# Patient Record
Sex: FEMALE | Race: WHITE | NOT HISPANIC OR LATINO | URBAN - METROPOLITAN AREA
[De-identification: names, ages, dates, MRNs, and addresses within clinical notes are randomized per-mention and may not be internally consistent; named-entity substitution may affect disease eponyms.]

---

## 2017-11-13 ENCOUNTER — RECORDS - HEALTHEAST (OUTPATIENT)
Dept: LAB | Facility: CLINIC | Age: 62
End: 2017-11-13

## 2017-11-14 LAB
CHOLEST SERPL-MCNC: 140 MG/DL
FASTING STATUS PATIENT QL REPORTED: ABNORMAL
HDLC SERPL-MCNC: 34 MG/DL
LDLC SERPL CALC-MCNC: 63 MG/DL
TRIGL SERPL-MCNC: 216 MG/DL

## 2017-11-15 LAB — HCV AB SERPL QL IA: NEGATIVE

## 2018-06-04 ENCOUNTER — RECORDS - HEALTHEAST (OUTPATIENT)
Dept: LAB | Facility: CLINIC | Age: 63
End: 2018-06-04

## 2018-06-05 LAB
ALBUMIN SERPL-MCNC: 4.2 G/DL (ref 3.5–5)
ALP SERPL-CCNC: 81 U/L (ref 45–120)
ALT SERPL W P-5'-P-CCNC: 22 U/L (ref 0–45)
ANION GAP SERPL CALCULATED.3IONS-SCNC: 13 MMOL/L (ref 5–18)
AST SERPL W P-5'-P-CCNC: 21 U/L (ref 0–40)
BILIRUB SERPL-MCNC: 0.5 MG/DL (ref 0–1)
BUN SERPL-MCNC: 15 MG/DL (ref 8–22)
CALCIUM SERPL-MCNC: 10 MG/DL (ref 8.5–10.5)
CHLORIDE BLD-SCNC: 104 MMOL/L (ref 98–107)
CO2 SERPL-SCNC: 26 MMOL/L (ref 22–31)
CREAT SERPL-MCNC: 0.7 MG/DL (ref 0.6–1.1)
GFR SERPL CREATININE-BSD FRML MDRD: >60 ML/MIN/1.73M2
GLUCOSE BLD-MCNC: 111 MG/DL (ref 70–125)
POTASSIUM BLD-SCNC: 4.2 MMOL/L (ref 3.5–5)
PROT SERPL-MCNC: 6.8 G/DL (ref 6–8)
SODIUM SERPL-SCNC: 143 MMOL/L (ref 136–145)

## 2018-11-05 ENCOUNTER — RECORDS - HEALTHEAST (OUTPATIENT)
Dept: LAB | Facility: CLINIC | Age: 63
End: 2018-11-05

## 2018-11-06 ENCOUNTER — RECORDS - HEALTHEAST (OUTPATIENT)
Dept: LAB | Facility: CLINIC | Age: 63
End: 2018-11-06

## 2018-11-06 LAB
ALBUMIN SERPL-MCNC: 3.7 G/DL (ref 3.5–5)
ALP SERPL-CCNC: 89 U/L (ref 45–120)
ALT SERPL W P-5'-P-CCNC: 14 U/L (ref 0–45)
ANION GAP SERPL CALCULATED.3IONS-SCNC: 10 MMOL/L (ref 5–18)
AST SERPL W P-5'-P-CCNC: 15 U/L (ref 0–40)
BILIRUB SERPL-MCNC: 0.3 MG/DL (ref 0–1)
BUN SERPL-MCNC: 14 MG/DL (ref 8–22)
CALCIUM SERPL-MCNC: 9.7 MG/DL (ref 8.5–10.5)
CHLORIDE BLD-SCNC: 104 MMOL/L (ref 98–107)
CHOLEST SERPL-MCNC: 141 MG/DL
CO2 SERPL-SCNC: 27 MMOL/L (ref 22–31)
CREAT SERPL-MCNC: 0.66 MG/DL (ref 0.6–1.1)
CREAT UR-MCNC: 184.4 MG/DL
FASTING STATUS PATIENT QL REPORTED: ABNORMAL
GFR SERPL CREATININE-BSD FRML MDRD: >60 ML/MIN/1.73M2
GLUCOSE BLD-MCNC: 125 MG/DL (ref 70–125)
HDLC SERPL-MCNC: 38 MG/DL
LDLC SERPL CALC-MCNC: 63 MG/DL
MICROALBUMIN UR-MCNC: 4.03 MG/DL (ref 0–1.99)
MICROALBUMIN/CREAT UR: 21.9 MG/G
POTASSIUM BLD-SCNC: 4.2 MMOL/L (ref 3.5–5)
PROT SERPL-MCNC: 6.5 G/DL (ref 6–8)
SODIUM SERPL-SCNC: 141 MMOL/L (ref 136–145)
TRIGL SERPL-MCNC: 201 MG/DL

## 2019-05-02 ENCOUNTER — RECORDS - HEALTHEAST (OUTPATIENT)
Dept: LAB | Facility: CLINIC | Age: 64
End: 2019-05-02

## 2019-05-02 ENCOUNTER — RECORDS - HEALTHEAST (OUTPATIENT)
Dept: ADMINISTRATIVE | Facility: OTHER | Age: 64
End: 2019-05-02

## 2019-05-02 LAB
ALBUMIN SERPL-MCNC: 3.8 G/DL (ref 3.5–5)
ANION GAP SERPL CALCULATED.3IONS-SCNC: 14 MMOL/L (ref 5–18)
BUN SERPL-MCNC: 17 MG/DL (ref 8–22)
CALCIUM SERPL-MCNC: 9.6 MG/DL (ref 8.5–10.5)
CHLORIDE BLD-SCNC: 107 MMOL/L (ref 98–107)
CO2 SERPL-SCNC: 21 MMOL/L (ref 22–31)
CREAT SERPL-MCNC: 0.68 MG/DL (ref 0.6–1.1)
GFR SERPL CREATININE-BSD FRML MDRD: >60 ML/MIN/1.73M2
GLUCOSE BLD-MCNC: 97 MG/DL (ref 70–125)
PHOSPHATE SERPL-MCNC: 4 MG/DL (ref 2.5–4.5)
POTASSIUM BLD-SCNC: 4.4 MMOL/L (ref 3.5–5)
SODIUM SERPL-SCNC: 142 MMOL/L (ref 136–145)

## 2019-07-16 ENCOUNTER — RECORDS - HEALTHEAST (OUTPATIENT)
Dept: ADMINISTRATIVE | Facility: OTHER | Age: 64
End: 2019-07-16

## 2019-07-25 ENCOUNTER — RECORDS - HEALTHEAST (OUTPATIENT)
Dept: ADMINISTRATIVE | Facility: OTHER | Age: 64
End: 2019-07-25

## 2019-07-29 ENCOUNTER — AMBULATORY - HEALTHEAST (OUTPATIENT)
Dept: SURGERY | Facility: CLINIC | Age: 64
End: 2019-07-29

## 2019-07-29 DIAGNOSIS — C80.1 DUCTAL CARCINOMA (H): ICD-10-CM

## 2019-07-30 ENCOUNTER — RECORDS - HEALTHEAST (OUTPATIENT)
Dept: RADIOLOGY | Facility: CLINIC | Age: 64
End: 2019-07-30

## 2019-07-30 ENCOUNTER — RECORDS - HEALTHEAST (OUTPATIENT)
Dept: ADMINISTRATIVE | Facility: OTHER | Age: 64
End: 2019-07-30

## 2019-08-08 ENCOUNTER — COMMUNICATION - HEALTHEAST (OUTPATIENT)
Dept: TELEHEALTH | Facility: CLINIC | Age: 64
End: 2019-08-08

## 2019-08-08 ENCOUNTER — HOSPITAL ENCOUNTER (OUTPATIENT)
Dept: SURGERY | Facility: CLINIC | Age: 64
Discharge: HOME | End: 2019-08-08
Attending: FAMILY MEDICINE

## 2019-08-08 DIAGNOSIS — C50.411 MALIGNANT NEOPLASM OF UPPER-OUTER QUADRANT OF RIGHT BREAST IN FEMALE, ESTROGEN RECEPTOR POSITIVE (H): ICD-10-CM

## 2019-08-08 DIAGNOSIS — Z17.0 MALIGNANT NEOPLASM OF UPPER-OUTER QUADRANT OF RIGHT BREAST IN FEMALE, ESTROGEN RECEPTOR POSITIVE (H): ICD-10-CM

## 2019-08-08 ASSESSMENT — MIFFLIN-ST. JEOR: SCORE: 1521.99

## 2019-08-12 ENCOUNTER — AMBULATORY - HEALTHEAST (OUTPATIENT)
Dept: SURGERY | Facility: CLINIC | Age: 64
End: 2019-08-12

## 2019-08-12 DIAGNOSIS — Z17.0 MALIGNANT NEOPLASM OF UPPER-OUTER QUADRANT OF RIGHT BREAST IN FEMALE, ESTROGEN RECEPTOR POSITIVE (H): ICD-10-CM

## 2019-08-12 DIAGNOSIS — C50.411 MALIGNANT NEOPLASM OF UPPER-OUTER QUADRANT OF RIGHT BREAST IN FEMALE, ESTROGEN RECEPTOR POSITIVE (H): ICD-10-CM

## 2019-08-13 ENCOUNTER — COMMUNICATION - HEALTHEAST (OUTPATIENT)
Dept: SURGERY | Facility: CLINIC | Age: 64
End: 2019-08-13

## 2019-08-21 ENCOUNTER — COMMUNICATION - HEALTHEAST (OUTPATIENT)
Dept: SURGERY | Facility: CLINIC | Age: 64
End: 2019-08-21

## 2019-09-03 ENCOUNTER — COMMUNICATION - HEALTHEAST (OUTPATIENT)
Dept: SURGERY | Facility: CLINIC | Age: 64
End: 2019-09-03

## 2019-09-03 DIAGNOSIS — C50.411 MALIGNANT NEOPLASM OF UPPER-OUTER QUADRANT OF RIGHT BREAST IN FEMALE, ESTROGEN RECEPTOR POSITIVE (H): ICD-10-CM

## 2019-09-03 DIAGNOSIS — Z17.0 MALIGNANT NEOPLASM OF UPPER-OUTER QUADRANT OF RIGHT BREAST IN FEMALE, ESTROGEN RECEPTOR POSITIVE (H): ICD-10-CM

## 2019-09-04 ENCOUNTER — RECORDS - HEALTHEAST (OUTPATIENT)
Dept: LAB | Facility: CLINIC | Age: 64
End: 2019-09-04

## 2019-09-04 ENCOUNTER — RECORDS - HEALTHEAST (OUTPATIENT)
Dept: ADMINISTRATIVE | Facility: OTHER | Age: 64
End: 2019-09-04

## 2019-09-04 LAB
ALBUMIN SERPL-MCNC: 4.2 G/DL (ref 3.5–5)
ANION GAP SERPL CALCULATED.3IONS-SCNC: 9 MMOL/L (ref 5–18)
BUN SERPL-MCNC: 17 MG/DL (ref 8–22)
CALCIUM SERPL-MCNC: 10.1 MG/DL (ref 8.5–10.5)
CHLORIDE BLD-SCNC: 105 MMOL/L (ref 98–107)
CO2 SERPL-SCNC: 29 MMOL/L (ref 22–31)
CREAT SERPL-MCNC: 0.79 MG/DL (ref 0.6–1.1)
GFR SERPL CREATININE-BSD FRML MDRD: >60 ML/MIN/1.73M2
GLUCOSE BLD-MCNC: 196 MG/DL (ref 70–125)
PHOSPHATE SERPL-MCNC: 3.1 MG/DL (ref 2.5–4.5)
POTASSIUM BLD-SCNC: 4.8 MMOL/L (ref 3.5–5)
SODIUM SERPL-SCNC: 143 MMOL/L (ref 136–145)

## 2019-09-04 ASSESSMENT — MIFFLIN-ST. JEOR: SCORE: 1521.99

## 2019-09-05 ENCOUNTER — ANESTHESIA - HEALTHEAST (OUTPATIENT)
Dept: SURGERY | Facility: AMBULATORY SURGERY CENTER | Age: 64
End: 2019-09-05

## 2019-09-06 ENCOUNTER — HOSPITAL ENCOUNTER (OUTPATIENT)
Dept: MAMMOGRAPHY | Facility: CLINIC | Age: 64
Discharge: HOME | End: 2019-09-06
Attending: SPECIALIST

## 2019-09-06 ENCOUNTER — HOSPITAL ENCOUNTER (OUTPATIENT)
Dept: NUCLEAR MEDICINE | Facility: HOSPITAL | Age: 64
Discharge: HOME | End: 2019-09-06
Attending: SPECIALIST

## 2019-09-06 ENCOUNTER — SURGERY - HEALTHEAST (OUTPATIENT)
Dept: SURGERY | Facility: AMBULATORY SURGERY CENTER | Age: 64
End: 2019-09-06

## 2019-09-06 ENCOUNTER — HOSPITAL ENCOUNTER (OUTPATIENT)
Dept: MAMMOGRAPHY | Facility: CLINIC | Age: 64
Discharge: HOME | End: 2019-09-06
Attending: SPECIALIST | Admitting: RADIOLOGY

## 2019-09-06 DIAGNOSIS — Z17.0 MALIGNANT NEOPLASM OF UPPER-OUTER QUADRANT OF RIGHT BREAST IN FEMALE, ESTROGEN RECEPTOR POSITIVE (H): ICD-10-CM

## 2019-09-06 DIAGNOSIS — C50.411 MALIGNANT NEOPLASM OF UPPER-OUTER QUADRANT OF RIGHT BREAST IN FEMALE, ESTROGEN RECEPTOR POSITIVE (H): ICD-10-CM

## 2019-09-06 RX ORDER — ASCORBIC ACID 500 MG
500 TABLET ORAL DAILY
Status: SHIPPED | COMMUNITY
Start: 2019-09-06

## 2019-09-06 ASSESSMENT — MIFFLIN-ST. JEOR: SCORE: 1521.99

## 2019-09-09 ENCOUNTER — RECORDS - HEALTHEAST (OUTPATIENT)
Dept: LAB | Facility: CLINIC | Age: 64
End: 2019-09-09

## 2019-09-09 LAB
ALBUMIN SERPL-MCNC: 3.9 G/DL (ref 3.5–5)
ANION GAP SERPL CALCULATED.3IONS-SCNC: 9 MMOL/L (ref 5–18)
BUN SERPL-MCNC: 14 MG/DL (ref 8–22)
CALCIUM SERPL-MCNC: 9.3 MG/DL (ref 8.5–10.5)
CHLORIDE BLD-SCNC: 106 MMOL/L (ref 98–107)
CO2 SERPL-SCNC: 26 MMOL/L (ref 22–31)
CREAT SERPL-MCNC: 0.71 MG/DL (ref 0.6–1.1)
GFR SERPL CREATININE-BSD FRML MDRD: >60 ML/MIN/1.73M2
GLUCOSE BLD-MCNC: 177 MG/DL (ref 70–125)
PHOSPHATE SERPL-MCNC: 3.4 MG/DL (ref 2.5–4.5)
POTASSIUM BLD-SCNC: 3.9 MMOL/L (ref 3.5–5)
SODIUM SERPL-SCNC: 141 MMOL/L (ref 136–145)

## 2019-09-10 ENCOUNTER — AMBULATORY - HEALTHEAST (OUTPATIENT)
Dept: SURGERY | Facility: CLINIC | Age: 64
End: 2019-09-10

## 2019-09-13 ENCOUNTER — COMMUNICATION - HEALTHEAST (OUTPATIENT)
Dept: SURGERY | Facility: CLINIC | Age: 64
End: 2019-09-13

## 2019-09-17 ENCOUNTER — RECORDS - HEALTHEAST (OUTPATIENT)
Dept: ADMINISTRATIVE | Facility: OTHER | Age: 64
End: 2019-09-17

## 2019-09-17 ENCOUNTER — HOSPITAL ENCOUNTER (OUTPATIENT)
Dept: SURGERY | Facility: CLINIC | Age: 64
Discharge: HOME | End: 2019-09-17
Attending: SPECIALIST

## 2019-09-17 DIAGNOSIS — C50.411 MALIGNANT NEOPLASM OF UPPER-OUTER QUADRANT OF RIGHT BREAST IN FEMALE, ESTROGEN RECEPTOR POSITIVE (H): ICD-10-CM

## 2019-09-17 DIAGNOSIS — Z17.0 MALIGNANT NEOPLASM OF UPPER-OUTER QUADRANT OF RIGHT BREAST IN FEMALE, ESTROGEN RECEPTOR POSITIVE (H): ICD-10-CM

## 2019-09-19 ENCOUNTER — COMMUNICATION - HEALTHEAST (OUTPATIENT)
Dept: SURGERY | Facility: CLINIC | Age: 64
End: 2019-09-19

## 2019-09-19 ENCOUNTER — AMBULATORY - HEALTHEAST (OUTPATIENT)
Dept: SURGERY | Facility: CLINIC | Age: 64
End: 2019-09-19

## 2019-09-19 DIAGNOSIS — Z17.0 MALIGNANT NEOPLASM OF UPPER-OUTER QUADRANT OF RIGHT BREAST IN FEMALE, ESTROGEN RECEPTOR POSITIVE (H): ICD-10-CM

## 2019-09-19 DIAGNOSIS — C50.411 MALIGNANT NEOPLASM OF UPPER-OUTER QUADRANT OF RIGHT BREAST IN FEMALE, ESTROGEN RECEPTOR POSITIVE (H): ICD-10-CM

## 2019-10-01 ENCOUNTER — COMMUNICATION - HEALTHEAST (OUTPATIENT)
Dept: SURGERY | Facility: CLINIC | Age: 64
End: 2019-10-01

## 2019-10-01 DIAGNOSIS — C50.411 MALIGNANT NEOPLASM OF UPPER-OUTER QUADRANT OF RIGHT BREAST IN FEMALE, ESTROGEN RECEPTOR POSITIVE (H): ICD-10-CM

## 2019-10-01 DIAGNOSIS — Z17.0 MALIGNANT NEOPLASM OF UPPER-OUTER QUADRANT OF RIGHT BREAST IN FEMALE, ESTROGEN RECEPTOR POSITIVE (H): ICD-10-CM

## 2019-10-22 ENCOUNTER — COMMUNICATION - HEALTHEAST (OUTPATIENT)
Dept: SURGERY | Facility: CLINIC | Age: 64
End: 2019-10-22

## 2019-10-23 ENCOUNTER — ANESTHESIA - HEALTHEAST (OUTPATIENT)
Dept: SURGERY | Facility: HOSPITAL | Age: 64
End: 2019-10-23

## 2019-10-23 ASSESSMENT — MIFFLIN-ST. JEOR: SCORE: 1521.99

## 2019-10-24 ENCOUNTER — SURGERY - HEALTHEAST (OUTPATIENT)
Dept: SURGERY | Facility: HOSPITAL | Age: 64
End: 2019-10-24

## 2019-10-24 ASSESSMENT — MIFFLIN-ST. JEOR: SCORE: 1521.99

## 2019-10-28 ENCOUNTER — AMBULATORY - HEALTHEAST (OUTPATIENT)
Dept: SURGERY | Facility: CLINIC | Age: 64
End: 2019-10-28

## 2019-10-28 ENCOUNTER — RECORDS - HEALTHEAST (OUTPATIENT)
Dept: LAB | Facility: CLINIC | Age: 64
End: 2019-10-28

## 2019-10-28 ENCOUNTER — COMMUNICATION - HEALTHEAST (OUTPATIENT)
Dept: ONCOLOGY | Facility: CLINIC | Age: 64
End: 2019-10-28

## 2019-10-28 ENCOUNTER — COMMUNICATION - HEALTHEAST (OUTPATIENT)
Dept: SURGERY | Facility: CLINIC | Age: 64
End: 2019-10-28

## 2019-10-28 DIAGNOSIS — C50.411 MALIGNANT NEOPLASM OF UPPER-OUTER QUADRANT OF RIGHT BREAST IN FEMALE, ESTROGEN RECEPTOR POSITIVE (H): ICD-10-CM

## 2019-10-28 DIAGNOSIS — Z17.0 MALIGNANT NEOPLASM OF UPPER-OUTER QUADRANT OF RIGHT BREAST IN FEMALE, ESTROGEN RECEPTOR POSITIVE (H): ICD-10-CM

## 2019-10-28 LAB
ALBUMIN SERPL-MCNC: 3.7 G/DL (ref 3.5–5)
ANION GAP SERPL CALCULATED.3IONS-SCNC: 14 MMOL/L (ref 5–18)
BUN SERPL-MCNC: 13 MG/DL (ref 8–22)
CALCIUM SERPL-MCNC: 9.5 MG/DL (ref 8.5–10.5)
CHLORIDE BLD-SCNC: 103 MMOL/L (ref 98–107)
CO2 SERPL-SCNC: 23 MMOL/L (ref 22–31)
CREAT SERPL-MCNC: 0.77 MG/DL (ref 0.6–1.1)
GFR SERPL CREATININE-BSD FRML MDRD: >60 ML/MIN/1.73M2
GLUCOSE BLD-MCNC: 307 MG/DL (ref 70–125)
PHOSPHATE SERPL-MCNC: 2.5 MG/DL (ref 2.5–4.5)
POTASSIUM BLD-SCNC: 3.8 MMOL/L (ref 3.5–5)
SODIUM SERPL-SCNC: 140 MMOL/L (ref 136–145)

## 2019-11-01 ENCOUNTER — COMMUNICATION - HEALTHEAST (OUTPATIENT)
Dept: SURGERY | Facility: CLINIC | Age: 64
End: 2019-11-01

## 2019-11-01 DIAGNOSIS — Z17.0 MALIGNANT NEOPLASM OF UPPER-OUTER QUADRANT OF RIGHT BREAST IN FEMALE, ESTROGEN RECEPTOR POSITIVE (H): ICD-10-CM

## 2019-11-01 DIAGNOSIS — C50.411 MALIGNANT NEOPLASM OF UPPER-OUTER QUADRANT OF RIGHT BREAST IN FEMALE, ESTROGEN RECEPTOR POSITIVE (H): ICD-10-CM

## 2019-11-04 ENCOUNTER — OFFICE VISIT - HEALTHEAST (OUTPATIENT)
Dept: RADIATION ONCOLOGY | Facility: CLINIC | Age: 64
End: 2019-11-04

## 2019-11-04 ENCOUNTER — AMBULATORY - HEALTHEAST (OUTPATIENT)
Dept: ONCOLOGY | Facility: CLINIC | Age: 64
End: 2019-11-04

## 2019-11-04 DIAGNOSIS — Z17.0 MALIGNANT NEOPLASM OF LOWER-OUTER QUADRANT OF RIGHT BREAST OF FEMALE, ESTROGEN RECEPTOR POSITIVE (H): ICD-10-CM

## 2019-11-04 DIAGNOSIS — C50.511 MALIGNANT NEOPLASM OF LOWER-OUTER QUADRANT OF RIGHT BREAST OF FEMALE, ESTROGEN RECEPTOR POSITIVE (H): ICD-10-CM

## 2019-11-04 RX ORDER — FERROUS SULFATE 325(65) MG
2 TABLET ORAL DAILY
Status: SHIPPED | COMMUNITY
Start: 2019-11-04

## 2019-11-04 RX ORDER — VIT A/VIT C/VIT E/ZINC/COPPER 4296-226
2 CAPSULE ORAL DAILY
Refills: 5 | Status: SHIPPED | COMMUNITY
Start: 2019-10-01

## 2019-11-04 RX ORDER — MAG HYDROX/ALUMINUM HYD/SIMETH 400-400-40
2 SUSPENSION, ORAL (FINAL DOSE FORM) ORAL DAILY
Status: SHIPPED | COMMUNITY
Start: 2019-11-04

## 2019-11-05 ENCOUNTER — HOSPITAL ENCOUNTER (OUTPATIENT)
Dept: PET IMAGING | Facility: HOSPITAL | Age: 64
Discharge: HOME | End: 2019-11-05
Attending: SPECIALIST

## 2019-11-05 DIAGNOSIS — Z17.0 MALIGNANT NEOPLASM OF UPPER-OUTER QUADRANT OF RIGHT BREAST IN FEMALE, ESTROGEN RECEPTOR POSITIVE (H): ICD-10-CM

## 2019-11-05 DIAGNOSIS — C50.411 MALIGNANT NEOPLASM OF UPPER-OUTER QUADRANT OF RIGHT BREAST IN FEMALE, ESTROGEN RECEPTOR POSITIVE (H): ICD-10-CM

## 2019-11-05 LAB — GLUCOSE BLDC GLUCOMTR-MCNC: 86 MG/DL (ref 70–139)

## 2019-11-05 ASSESSMENT — MIFFLIN-ST. JEOR: SCORE: 1508.38

## 2019-11-06 ENCOUNTER — AMBULATORY - HEALTHEAST (OUTPATIENT)
Dept: ONCOLOGY | Facility: CLINIC | Age: 64
End: 2019-11-06

## 2019-11-06 ENCOUNTER — OFFICE VISIT - HEALTHEAST (OUTPATIENT)
Dept: ONCOLOGY | Facility: CLINIC | Age: 64
End: 2019-11-06

## 2019-11-06 DIAGNOSIS — C50.411 MALIGNANT NEOPLASM OF UPPER-OUTER QUADRANT OF RIGHT BREAST IN FEMALE, ESTROGEN RECEPTOR POSITIVE (H): ICD-10-CM

## 2019-11-06 DIAGNOSIS — Z17.0 MALIGNANT NEOPLASM OF UPPER-OUTER QUADRANT OF RIGHT BREAST IN FEMALE, ESTROGEN RECEPTOR POSITIVE (H): ICD-10-CM

## 2019-11-06 ASSESSMENT — MIFFLIN-ST. JEOR: SCORE: 1524.03

## 2019-11-08 ENCOUNTER — COMMUNICATION - HEALTHEAST (OUTPATIENT)
Dept: ONCOLOGY | Facility: CLINIC | Age: 64
End: 2019-11-08

## 2019-11-11 ENCOUNTER — COMMUNICATION - HEALTHEAST (OUTPATIENT)
Dept: ONCOLOGY | Facility: CLINIC | Age: 64
End: 2019-11-11

## 2019-11-13 ENCOUNTER — COMMUNICATION - HEALTHEAST (OUTPATIENT)
Dept: ONCOLOGY | Facility: CLINIC | Age: 64
End: 2019-11-13

## 2019-12-04 ENCOUNTER — COMMUNICATION - HEALTHEAST (OUTPATIENT)
Dept: SURGERY | Facility: CLINIC | Age: 64
End: 2019-12-04

## 2019-12-04 DIAGNOSIS — C50.411 MALIGNANT NEOPLASM OF UPPER-OUTER QUADRANT OF RIGHT BREAST IN FEMALE, ESTROGEN RECEPTOR POSITIVE (H): ICD-10-CM

## 2019-12-04 DIAGNOSIS — Z17.0 MALIGNANT NEOPLASM OF UPPER-OUTER QUADRANT OF RIGHT BREAST IN FEMALE, ESTROGEN RECEPTOR POSITIVE (H): ICD-10-CM

## 2019-12-05 ENCOUNTER — COMMUNICATION - HEALTHEAST (OUTPATIENT)
Dept: ONCOLOGY | Facility: CLINIC | Age: 64
End: 2019-12-05

## 2019-12-11 ENCOUNTER — AMBULATORY - HEALTHEAST (OUTPATIENT)
Dept: RADIATION ONCOLOGY | Facility: HOSPITAL | Age: 64
End: 2019-12-11

## 2019-12-16 ENCOUNTER — COMMUNICATION - HEALTHEAST (OUTPATIENT)
Dept: SURGERY | Facility: CLINIC | Age: 64
End: 2019-12-16

## 2019-12-16 ENCOUNTER — COMMUNICATION - HEALTHEAST (OUTPATIENT)
Dept: RADIATION ONCOLOGY | Facility: CLINIC | Age: 64
End: 2019-12-16

## 2019-12-17 ENCOUNTER — HOSPITAL ENCOUNTER (OUTPATIENT)
Dept: SURGERY | Facility: CLINIC | Age: 64
Discharge: HOME | End: 2019-12-17
Attending: SPECIALIST

## 2019-12-17 DIAGNOSIS — L76.34 POSTOPERATIVE SEROMA OF SUBCUTANEOUS TISSUE AFTER NON-DERMATOLOGIC PROCEDURE: ICD-10-CM

## 2019-12-18 ENCOUNTER — COMMUNICATION - HEALTHEAST (OUTPATIENT)
Dept: ONCOLOGY | Facility: CLINIC | Age: 64
End: 2019-12-18

## 2019-12-26 ENCOUNTER — RECORDS - HEALTHEAST (OUTPATIENT)
Dept: ADMINISTRATIVE | Facility: OTHER | Age: 64
End: 2019-12-26

## 2019-12-26 ENCOUNTER — RECORDS - HEALTHEAST (OUTPATIENT)
Dept: LAB | Facility: CLINIC | Age: 64
End: 2019-12-26

## 2019-12-26 LAB
ALBUMIN SERPL-MCNC: 4 G/DL (ref 3.5–5)
ALP SERPL-CCNC: 82 U/L (ref 45–120)
ALT SERPL W P-5'-P-CCNC: 21 U/L (ref 0–45)
ANION GAP SERPL CALCULATED.3IONS-SCNC: 12 MMOL/L (ref 5–18)
AST SERPL W P-5'-P-CCNC: 20 U/L (ref 0–40)
BILIRUB SERPL-MCNC: 0.6 MG/DL (ref 0–1)
BUN SERPL-MCNC: 16 MG/DL (ref 8–22)
CALCIUM SERPL-MCNC: 9.9 MG/DL (ref 8.5–10.5)
CHLORIDE BLD-SCNC: 104 MMOL/L (ref 98–107)
CHOLEST SERPL-MCNC: 126 MG/DL
CO2 SERPL-SCNC: 24 MMOL/L (ref 22–31)
CREAT SERPL-MCNC: 0.7 MG/DL (ref 0.6–1.1)
CREAT UR-MCNC: 158.8 MG/DL
FASTING STATUS PATIENT QL REPORTED: ABNORMAL
GFR SERPL CREATININE-BSD FRML MDRD: >60 ML/MIN/1.73M2
GLUCOSE BLD-MCNC: 173 MG/DL (ref 70–125)
HDLC SERPL-MCNC: 39 MG/DL
LDLC SERPL CALC-MCNC: 37 MG/DL
MICROALBUMIN UR-MCNC: 1.78 MG/DL (ref 0–1.99)
MICROALBUMIN/CREAT UR: 11.2 MG/G
POTASSIUM BLD-SCNC: 4.4 MMOL/L (ref 3.5–5)
PROT SERPL-MCNC: 6.7 G/DL (ref 6–8)
SODIUM SERPL-SCNC: 140 MMOL/L (ref 136–145)
TRIGL SERPL-MCNC: 249 MG/DL

## 2019-12-27 ENCOUNTER — COMMUNICATION - HEALTHEAST (OUTPATIENT)
Dept: ONCOLOGY | Facility: CLINIC | Age: 64
End: 2019-12-27

## 2020-01-04 ENCOUNTER — COMMUNICATION - HEALTHEAST (OUTPATIENT)
Dept: SURGERY | Facility: CLINIC | Age: 65
End: 2020-01-04

## 2020-01-04 DIAGNOSIS — Z17.0 MALIGNANT NEOPLASM OF UPPER-OUTER QUADRANT OF RIGHT BREAST IN FEMALE, ESTROGEN RECEPTOR POSITIVE (H): ICD-10-CM

## 2020-01-04 DIAGNOSIS — C50.411 MALIGNANT NEOPLASM OF UPPER-OUTER QUADRANT OF RIGHT BREAST IN FEMALE, ESTROGEN RECEPTOR POSITIVE (H): ICD-10-CM

## 2020-01-07 ENCOUNTER — AMBULATORY - HEALTHEAST (OUTPATIENT)
Dept: RADIATION ONCOLOGY | Facility: HOSPITAL | Age: 65
End: 2020-01-07

## 2020-01-16 ENCOUNTER — OFFICE VISIT - HEALTHEAST (OUTPATIENT)
Dept: RADIATION ONCOLOGY | Facility: CLINIC | Age: 65
End: 2020-01-16

## 2020-01-16 DIAGNOSIS — C50.511 MALIGNANT NEOPLASM OF LOWER-OUTER QUADRANT OF RIGHT BREAST OF FEMALE, ESTROGEN RECEPTOR POSITIVE (H): ICD-10-CM

## 2020-01-16 DIAGNOSIS — Z17.0 MALIGNANT NEOPLASM OF LOWER-OUTER QUADRANT OF RIGHT BREAST OF FEMALE, ESTROGEN RECEPTOR POSITIVE (H): ICD-10-CM

## 2020-01-23 ENCOUNTER — OFFICE VISIT - HEALTHEAST (OUTPATIENT)
Dept: RADIATION ONCOLOGY | Facility: CLINIC | Age: 65
End: 2020-01-23

## 2020-01-23 DIAGNOSIS — C50.511 MALIGNANT NEOPLASM OF LOWER-OUTER QUADRANT OF RIGHT BREAST OF FEMALE, ESTROGEN RECEPTOR POSITIVE (H): ICD-10-CM

## 2020-01-23 DIAGNOSIS — Z17.0 MALIGNANT NEOPLASM OF LOWER-OUTER QUADRANT OF RIGHT BREAST OF FEMALE, ESTROGEN RECEPTOR POSITIVE (H): ICD-10-CM

## 2020-01-24 ENCOUNTER — AMBULATORY - HEALTHEAST (OUTPATIENT)
Dept: ONCOLOGY | Facility: CLINIC | Age: 65
End: 2020-01-24

## 2020-01-28 ENCOUNTER — COMMUNICATION - HEALTHEAST (OUTPATIENT)
Dept: SURGERY | Facility: CLINIC | Age: 65
End: 2020-01-28

## 2020-01-28 DIAGNOSIS — Z17.0 MALIGNANT NEOPLASM OF UPPER-OUTER QUADRANT OF RIGHT BREAST IN FEMALE, ESTROGEN RECEPTOR POSITIVE (H): ICD-10-CM

## 2020-01-28 DIAGNOSIS — C50.411 MALIGNANT NEOPLASM OF UPPER-OUTER QUADRANT OF RIGHT BREAST IN FEMALE, ESTROGEN RECEPTOR POSITIVE (H): ICD-10-CM

## 2020-01-30 ENCOUNTER — OFFICE VISIT - HEALTHEAST (OUTPATIENT)
Dept: RADIATION ONCOLOGY | Facility: CLINIC | Age: 65
End: 2020-01-30

## 2020-01-30 ENCOUNTER — COMMUNICATION - HEALTHEAST (OUTPATIENT)
Dept: ONCOLOGY | Facility: CLINIC | Age: 65
End: 2020-01-30

## 2020-01-30 DIAGNOSIS — Z17.0 MALIGNANT NEOPLASM OF LOWER-OUTER QUADRANT OF RIGHT BREAST OF FEMALE, ESTROGEN RECEPTOR POSITIVE (H): ICD-10-CM

## 2020-01-30 DIAGNOSIS — C50.511 MALIGNANT NEOPLASM OF LOWER-OUTER QUADRANT OF RIGHT BREAST OF FEMALE, ESTROGEN RECEPTOR POSITIVE (H): ICD-10-CM

## 2020-02-04 ENCOUNTER — COMMUNICATION - HEALTHEAST (OUTPATIENT)
Dept: ONCOLOGY | Facility: HOSPITAL | Age: 65
End: 2020-02-04

## 2020-02-06 ENCOUNTER — OFFICE VISIT - HEALTHEAST (OUTPATIENT)
Dept: RADIATION ONCOLOGY | Facility: CLINIC | Age: 65
End: 2020-02-06

## 2020-02-06 DIAGNOSIS — C50.511 MALIGNANT NEOPLASM OF LOWER-OUTER QUADRANT OF RIGHT BREAST OF FEMALE, ESTROGEN RECEPTOR POSITIVE (H): ICD-10-CM

## 2020-02-06 DIAGNOSIS — Z17.0 MALIGNANT NEOPLASM OF LOWER-OUTER QUADRANT OF RIGHT BREAST OF FEMALE, ESTROGEN RECEPTOR POSITIVE (H): ICD-10-CM

## 2020-02-13 ENCOUNTER — OFFICE VISIT - HEALTHEAST (OUTPATIENT)
Dept: RADIATION ONCOLOGY | Facility: CLINIC | Age: 65
End: 2020-02-13

## 2020-02-13 DIAGNOSIS — Z17.0 MALIGNANT NEOPLASM OF LOWER-OUTER QUADRANT OF RIGHT BREAST OF FEMALE, ESTROGEN RECEPTOR POSITIVE (H): ICD-10-CM

## 2020-02-13 DIAGNOSIS — C50.511 MALIGNANT NEOPLASM OF LOWER-OUTER QUADRANT OF RIGHT BREAST OF FEMALE, ESTROGEN RECEPTOR POSITIVE (H): ICD-10-CM

## 2020-02-19 ENCOUNTER — COMMUNICATION - HEALTHEAST (OUTPATIENT)
Dept: ONCOLOGY | Facility: CLINIC | Age: 65
End: 2020-02-19

## 2020-02-20 ENCOUNTER — OFFICE VISIT - HEALTHEAST (OUTPATIENT)
Dept: RADIATION ONCOLOGY | Facility: CLINIC | Age: 65
End: 2020-02-20

## 2020-02-20 DIAGNOSIS — Z17.0 MALIGNANT NEOPLASM OF LOWER-OUTER QUADRANT OF RIGHT BREAST OF FEMALE, ESTROGEN RECEPTOR POSITIVE (H): ICD-10-CM

## 2020-02-20 DIAGNOSIS — C50.511 MALIGNANT NEOPLASM OF LOWER-OUTER QUADRANT OF RIGHT BREAST OF FEMALE, ESTROGEN RECEPTOR POSITIVE (H): ICD-10-CM

## 2020-02-25 ENCOUNTER — AMBULATORY - HEALTHEAST (OUTPATIENT)
Dept: RADIATION ONCOLOGY | Facility: CLINIC | Age: 65
End: 2020-02-25

## 2020-03-03 ENCOUNTER — OFFICE VISIT - HEALTHEAST (OUTPATIENT)
Dept: ONCOLOGY | Facility: CLINIC | Age: 65
End: 2020-03-03

## 2020-03-03 DIAGNOSIS — Z79.811 AROMATASE INHIBITOR USE: ICD-10-CM

## 2020-03-03 DIAGNOSIS — Z17.0 MALIGNANT NEOPLASM OF LOWER-OUTER QUADRANT OF RIGHT BREAST OF FEMALE, ESTROGEN RECEPTOR POSITIVE (H): ICD-10-CM

## 2020-03-03 DIAGNOSIS — C50.411 MALIGNANT NEOPLASM OF UPPER-OUTER QUADRANT OF RIGHT BREAST IN FEMALE, ESTROGEN RECEPTOR POSITIVE (H): ICD-10-CM

## 2020-03-03 DIAGNOSIS — M85.9 LOW BONE DENSITY: ICD-10-CM

## 2020-03-03 DIAGNOSIS — C50.511 MALIGNANT NEOPLASM OF LOWER-OUTER QUADRANT OF RIGHT BREAST OF FEMALE, ESTROGEN RECEPTOR POSITIVE (H): ICD-10-CM

## 2020-03-03 DIAGNOSIS — Z17.0 MALIGNANT NEOPLASM OF UPPER-OUTER QUADRANT OF RIGHT BREAST IN FEMALE, ESTROGEN RECEPTOR POSITIVE (H): ICD-10-CM

## 2020-03-03 RX ORDER — ANASTROZOLE 1 MG/1
1 TABLET ORAL DAILY
Qty: 90 TABLET | Refills: 3 | Status: SHIPPED | OUTPATIENT
Start: 2020-03-03

## 2020-03-13 ENCOUNTER — COMMUNICATION - HEALTHEAST (OUTPATIENT)
Dept: RADIATION ONCOLOGY | Facility: CLINIC | Age: 65
End: 2020-03-13

## 2020-03-22 ENCOUNTER — RECORDS - HEALTHEAST (OUTPATIENT)
Dept: ADMINISTRATIVE | Facility: OTHER | Age: 65
End: 2020-03-22

## 2020-04-06 ENCOUNTER — OFFICE VISIT - HEALTHEAST (OUTPATIENT)
Dept: RADIATION ONCOLOGY | Facility: CLINIC | Age: 65
End: 2020-04-06

## 2020-04-06 DIAGNOSIS — Z17.0 MALIGNANT NEOPLASM OF LOWER-OUTER QUADRANT OF RIGHT BREAST OF FEMALE, ESTROGEN RECEPTOR POSITIVE (H): ICD-10-CM

## 2020-04-06 DIAGNOSIS — C50.511 MALIGNANT NEOPLASM OF LOWER-OUTER QUADRANT OF RIGHT BREAST OF FEMALE, ESTROGEN RECEPTOR POSITIVE (H): ICD-10-CM

## 2020-04-06 RX ORDER — CLINDAMYCIN HCL 300 MG
CAPSULE ORAL
Status: SHIPPED | COMMUNITY
Start: 2020-03-04

## 2020-06-16 ENCOUNTER — RECORDS - HEALTHEAST (OUTPATIENT)
Dept: LAB | Facility: CLINIC | Age: 65
End: 2020-06-16

## 2020-06-16 ENCOUNTER — RECORDS - HEALTHEAST (OUTPATIENT)
Dept: ADMINISTRATIVE | Facility: OTHER | Age: 65
End: 2020-06-16

## 2020-06-16 LAB
ALBUMIN SERPL-MCNC: 4 G/DL (ref 3.5–5)
ALP SERPL-CCNC: 71 U/L (ref 45–120)
ALT SERPL W P-5'-P-CCNC: 25 U/L (ref 0–45)
ANION GAP SERPL CALCULATED.3IONS-SCNC: 11 MMOL/L (ref 5–18)
AST SERPL W P-5'-P-CCNC: 22 U/L (ref 0–40)
BILIRUB SERPL-MCNC: 0.5 MG/DL (ref 0–1)
BUN SERPL-MCNC: 17 MG/DL (ref 8–22)
CALCIUM SERPL-MCNC: 9.9 MG/DL (ref 8.5–10.5)
CHLORIDE BLD-SCNC: 105 MMOL/L (ref 98–107)
CO2 SERPL-SCNC: 27 MMOL/L (ref 22–31)
CREAT SERPL-MCNC: 0.77 MG/DL (ref 0.6–1.1)
GFR SERPL CREATININE-BSD FRML MDRD: >60 ML/MIN/1.73M2
GLUCOSE BLD-MCNC: 172 MG/DL (ref 70–125)
POTASSIUM BLD-SCNC: 4.2 MMOL/L (ref 3.5–5)
PROT SERPL-MCNC: 6.7 G/DL (ref 6–8)
SODIUM SERPL-SCNC: 143 MMOL/L (ref 136–145)

## 2020-08-25 ENCOUNTER — OFFICE VISIT - HEALTHEAST (OUTPATIENT)
Dept: ONCOLOGY | Facility: CLINIC | Age: 65
End: 2020-08-25

## 2020-08-25 DIAGNOSIS — Z17.0 MALIGNANT NEOPLASM OF OVERLAPPING SITES OF RIGHT BREAST IN FEMALE, ESTROGEN RECEPTOR POSITIVE (H): ICD-10-CM

## 2020-08-25 DIAGNOSIS — C50.811 MALIGNANT NEOPLASM OF OVERLAPPING SITES OF RIGHT BREAST IN FEMALE, ESTROGEN RECEPTOR POSITIVE (H): ICD-10-CM

## 2020-08-25 DIAGNOSIS — Z79.811 AROMATASE INHIBITOR USE: ICD-10-CM

## 2020-09-02 ENCOUNTER — COMMUNICATION - HEALTHEAST (OUTPATIENT)
Dept: ONCOLOGY | Facility: CLINIC | Age: 65
End: 2020-09-02

## 2020-09-22 ENCOUNTER — COMMUNICATION - HEALTHEAST (OUTPATIENT)
Dept: ONCOLOGY | Facility: CLINIC | Age: 65
End: 2020-09-22

## 2020-10-21 ENCOUNTER — COMMUNICATION - HEALTHEAST (OUTPATIENT)
Dept: ONCOLOGY | Facility: CLINIC | Age: 65
End: 2020-10-21

## 2020-11-12 ENCOUNTER — COMMUNICATION - HEALTHEAST (OUTPATIENT)
Dept: ONCOLOGY | Facility: CLINIC | Age: 65
End: 2020-11-12

## 2021-05-23 ENCOUNTER — HEALTH MAINTENANCE LETTER (OUTPATIENT)
Age: 66
End: 2021-05-23

## 2021-05-31 NOTE — PROGRESS NOTES
"[facility] with Dr. Benavides regarding her newly diagnosed breast cancer.  She is accompanied by her sister for consult.  RN assessment and EMR update.  /72 (Patient Site: Left Arm, Patient Position: Sitting)   Pulse 71   Resp 16   Ht 5' 4\" (1.626 m)   Wt 222 lb (100.7 kg)   SpO2 96%   BMI 38.11 kg/m  .  Patient given RN and MD cards, given information on breast cancer and a handout from [facility] on being newly diagnosed.  She met with Dr. Benavides, see dictation for details.  She wishes to thinking over her options.  She was given the number for surgery scheduling to call when her decision is made.  RN time 18 mins  "

## 2021-05-31 NOTE — PROGRESS NOTES
This is a 64 y.o.  female who I'm asked to see by Igor Scherer MD for evaluation of a right breast cancer.  This was picked up  on screening mammogram.  The patient cannot feel a mass.  They actually saw 3 little lesions right near each other so possibly one large lesion.  A needle biopsy was done which shows an invasive ductal carcinoma.  It is estrogen receptor positive, progesterone receptor positive and HER-2 negative.  One suspicious lymph node was also seen and biopsied which was positive for metastatic disease    She has Some family history of breast cancer.  Her sister had breast cancer and her mother.      PAST MEDICAL HISTORY:  Patient Active Problem List   Diagnosis     Acid reflux     Diabetes mellitus, type 2 (H)     HLD (hyperlipidemia)     Relative anemia       Medications:    Current Outpatient Medications:      acetaminophen (TYLENOL) 500 MG tablet, Take 500 mg by mouth every 6 (six) hours as needed for pain., Disp: , Rfl:      aspirin 81 MG EC tablet, Take 81 mg by mouth daily., Disp: , Rfl:      calcium, as carbonate, (TUMS) 200 mg calcium (500 mg) chewable tablet, Chew 1 tablet daily as needed for heartburn., Disp: , Rfl:      calcium-vitamin D (CALCIUM-VITAMIN D) 500 mg(1,250mg) -200 unit per tablet, Take 1 tablet by mouth daily with breakfast., Disp: , Rfl:      docoshexanoic acid-eicosapent 500 mg (FISH OIL) 500-100 mg cap capsule, Take 500 mg by mouth daily., Disp: , Rfl:      ferrous sulfate 325 (65 FE) MG tablet, Take 1 tablet by mouth 2 (two) times a day. , Disp: , Rfl:      glipiZIDE (GLUCOTROL) 10 MG tablet, Take 10 mg by mouth Daily before breakfast., Disp: , Rfl:      linagliptin 5 mg Tab, Take 5 mg by mouth daily., Disp: , Rfl:      metFORMIN (GLUCOPHAGE-XR) 500 MG 24 hr tablet, Take 2,000 mg by mouth once daily., Disp: , Rfl:      omeprazole (PRILOSEC) 20 MG capsule, Take 20 mg by mouth Daily before breakfast., Disp: , Rfl:      pioglitazone (ACTOS) 30 MG tablet, Take 30 mg by  "mouth daily., Disp: , Rfl:      rosuvastatin (CRESTOR) 10 MG tablet, Take 10 mg by mouth bedtime., Disp: , Rfl:      vit C,E,Zn,Cu-omega3-lut-zeax (PRESERVISION AREDS 2, OMEGA-3,) 250-2.5-0.5 mg cap, Take 2 capsules by mouth once daily., Disp: , Rfl:      anastrozole (ARIMIDEX) 1 mg tablet, Take 1 tablet (1 mg total) by mouth daily., Disp: 30 tablet, Rfl: 0     multivitamin with minerals (THERA-M) 9 mg iron-400 mcg Tab tablet, Take 1 tablet by mouth daily., Disp: , Rfl:     Allergies:  Allergies   Allergen Reactions     Iodinated Contrast- Oral And Iv Dye Hives, Shortness Of Breath and Swelling     Penicillins Hives, Shortness Of Breath and Swelling       Social History:   reports that she has never smoked. She has never used smokeless tobacco. She reports that she drinks alcohol. She reports that she does not use drugs.    ROS:  A 12 point comprehensive review of systems was negative except as noted.    Physical Exam  /72 (Patient Site: Left Arm, Patient Position: Sitting)   Pulse 71   Resp 16   Ht 5' 4\" (1.626 m)   Wt 222 lb (100.7 kg)   SpO2 96%   BMI 38.11 kg/m    General:alert, appears stated age and cooperative  Lungs:clear to auscultation bilaterally  CV:Regular rhythm.  Breasts: There is a somewhat palpable fullness in the lateral aspect of the right breast but very ill-defined and I think is just bruising.  I cannot say can clearly feel the lump.  No other palpable areas of concern.  Lymph Nodes:no axillary nodes palpated  Neuro:Grossly normal  Musculoskeletal: Normal range of motion of her upper extremities.  Skin: Normal skin turgor.  Psych: Somewhat anxious.    Reviewed her mammograms and ultrasound and pathology.     Impression: Right Breast Cancer. Clinically T2, N1.  Discussed the surgical options for treatment of breast cancer which generally are a lumpectomy (partial mastectomy) combined with radiation versus a mastectomy.  Explained that the survival benefit is the same for both.  The " difference is in local recurrence risk.  The patient is a Reasonable candidate for a lumpectomy.  The only concern is her is that they thought they saw 3 separate lesions.  They are all in very close proximity however, so I do think a lumpectomy is a reasonable option.  Explained to her that you never really know if you are a candidate until you try and get the final pathology pack.  There is a chance that after a lumpectomy I would get the final pathology that would indicate she needs more surgery, even possibly a mastectomy.  The other option is just to do a mastectomy.  However you do not improve your survival one way or the other.  Whether or not she needs chemotherapy is also not dependent on what she does surgically.  Discussed SLN biopsy.  The procedure and rationale were explained.  Even with a known positive node I think doing a sentinel lymph node biopsy is reasonable.  I would remove the involved lymph node for sure and if anything else like septum we would also remove them.  Discussed that at this point we do not know yet whether or not she will need chemotherapy and we may not know until we get all of the results of surgery back.      Plan: She wants to take some time to think about the options.  Have given her some information and then also my 's number to call and get surgery scheduled when she decides.  Lumpectomy is typically an outpatient surgery done under local anesthetic with IV sedation.  If she chooses a mastectomy then often times at the night in the hospital depending on whether or not she has reconstruction.  She and her sister who was with her asked very appropriate questions.

## 2021-06-01 NOTE — PROGRESS NOTES
In for follow-up of her right lumpectomy  with sentinel lymph node biopsy.  She is feeling well.  She is having very minimal pain.      Physical exam:  Appears well.  Does not appear in any discomfort.  Breasts: Incisions are healing nicely with no signs of infection.  No swelling.    Pathology: The tumor was multifocal and extended over 3.0 cm but there is also an area of DCIS that extended closer to 7 cm.  The margins are positive.  1 of 2 sentinel lymph nodes was involved.    Impression: Postop visit.  Reviewed her final pathology.  She was not surprised by this when I called her on the phone because I was somewhat worried about the size going into surgery.  She is already visited with a plastic surgeon she understands she needs a mastectomy.  She is actually opted for bilateral mastectomies.  I made sure she understood that this does not improve her prognosis.  I would plan to also sample just a few more lymph nodes just to be sure there is nothing else involved.  I did explain that she likely would be recommended to do radiation.  On a good note, her Oncotype is back already and it is a score of 18 which means she will not likely be recommended to do chemotherapy.  She will likely just need hormone therapy that she is already on.    Plan: I lateral mastectomies with additional lymph node sampling on the right to be fine with immediate first stage reconstruction with Dr. Culp.  This is typically an overnight stay in the hospital.  She asked all appropriate questions.

## 2021-06-01 NOTE — PROGRESS NOTES
"[facility] op appointment with Dr. Benavides.  She said she has met with Dr. Culp and is planning bilateral mastectomies.  RN assessment and EMR update.  /78 (Patient Site: Left Arm, Patient Position: Sitting)   Pulse 81   Resp 16   Ht 5' 4\" (1.626 m)   SpO2 98%   BMI 38.11 kg/m  .  She met with Dr. Benavides, see dictation for details.  She will plan to follow up with Dr. Benavides post op bilateral mastectomies.  RN time 12 mins  "

## 2021-06-01 NOTE — ANESTHESIA PREPROCEDURE EVALUATION
Anesthesia Evaluation      Patient summary reviewed   History of anesthetic complications (Sore throat, dysphagia after intubation in the past.  Has had unremarkable intubation since.)     Airway   Mallampati: II  Neck ROM: full   Pulmonary - negative ROS and normal exam                          Cardiovascular - normal exam  Exercise tolerance: > or = 4 METS  (+) , hypercholesterolemia,      Neuro/Psych - negative ROS     Endo/Other    (+) diabetes mellitus type 2, obesity (BMI 38),      Comments: Iron deficiency anemia    Right breast Ca    GI/Hepatic/Renal    (+) GERD,             Dental - normal exam                        Anesthesia Plan  Planned anesthetic: MAC  FiO2 less than 0.30 during cautery  ASA 2     Anesthetic plan and risks discussed with: patient  Anesthesia plan special considerations: antiemetics,   Post-op plan: routine recovery

## 2021-06-01 NOTE — ANESTHESIA POSTPROCEDURE EVALUATION
Patient: Shireen Simpson  Right Lumpectomy after Wire Localization; [place] Lymph Node Biopsy  Anesthesia type: MAC    Patient location: Phase II Recovery  Last vitals:   Vitals Value Taken Time   /72 9/6/2019  2:17 PM   Temp 36.3  C (97.3  F) 9/6/2019  1:45 PM   Pulse 68 9/6/2019  2:21 PM   Resp 16 9/6/2019  2:00 PM   SpO2 95 % 9/6/2019  2:21 PM   Vitals shown include unvalidated device data.  Post vital signs: stable  Level of consciousness: awake, alert and oriented  Post-anesthesia pain: pain controlled  Post-anesthesia nausea and vomiting: no  Pulmonary: unassisted, return to baseline  Cardiovascular: stable and blood pressure at baseline  Hydration: adequate  Anesthetic events: no    QCDR Measures:  ASA# 11 - Екатерина-op Cardiac Arrest: ASA11B - Patient did NOT experience unanticipated cardiac arrest  ASA# 12 - Екатерина-op Mortality Rate: ASA12B - Patient did NOT die  ASA# 13 - PACU Re-Intubation Rate: NA - No ETT / LMA used for case  ASA# 10 - Composite Anes Safety: ASA10A - No serious adverse event    Additional Notes:

## 2021-06-01 NOTE — ANESTHESIA CARE TRANSFER NOTE
Last vitals:   Vitals:    09/06/19 1400   BP: 144/71   Pulse: 65   Resp: 16   Temp: 97.4   SpO2: 94%     Patient's level of consciousness is drowsy  Spontaneous respirations: yes  Maintains airway independently: yes  Dentition unchanged: yes  Oropharynx: oropharynx clear of all foreign objects    QCDR Measures:  ASA# 20 - Surgical Safety Checklist: WHO surgical safety checklist completed prior to induction    PQRS# 430 - Adult PONV Prevention: 4558F - Pt received => 2 anti-emetic agents (different classes) preop & intraop  ASA# 8 - Peds PONV Prevention: NA - Not pediatric patient, not GA or 2 or more risk factors NOT present  PQRS# 424 - Екатерина-op Temp Management: 4559F - At least one body temp DOCUMENTED => 35.5C or 95.9F within required timeframe  PQRS# 426 - PACU Transfer Protocol: - Transfer of care checklist used  ASA# 14 - Acute Post-op Pain: ASA14B - Patient did NOT experience pain >= 7 out of 10

## 2021-06-02 NOTE — ANESTHESIA POSTPROCEDURE EVALUATION
Patient: Shireen Simpson  Bilateral mastectomies - Dr. Culp to do reconstruction, right axillary lymph node sampling, BILATERAL FIRST STAGE RECONSTRUCTION DIRECT TO IMPLANT WITH ALLODERM AND SILICONE IMPLANTS  Anesthesia type: general    Patient location: PACU  Last vitals:   Vitals Value Taken Time   /73 10/24/2019  3:46 PM   Temp 36.1  C (97  F) 10/24/2019  2:42 PM   Pulse 63 10/24/2019  4:06 PM   Resp 9 10/24/2019  4:06 PM   SpO2 96 % 10/24/2019  4:06 PM   Vitals shown include unvalidated device data.  Post vital signs: stable  Level of consciousness: alert  Post-anesthesia pain: pain controlled now  Post-anesthesia nausea and vomiting: no  Pulmonary: won't keep oxygen on face, but sat 93% on RA  Cardiovascular: stable and blood pressure at baseline  Hydration: adequate  Anesthetic events: no    QCDR Measures:  ASA# 11 - Екатерина-op Cardiac Arrest: ASA11B - Patient did NOT experience unanticipated cardiac arrest  ASA# 12 - Екатерина-op Mortality Rate: ASA12B - Patient did NOT die  ASA# 13 - PACU Re-Intubation Rate: ASA13B - Patient did NOT require a new airway mgmt  ASA# 10 - Composite Anes Safety: ASA10A - No serious adverse event    Additional Notes:

## 2021-06-02 NOTE — ANESTHESIA PREPROCEDURE EVALUATION
Anesthesia Evaluation      Patient summary reviewed   History of anesthetic complications     Airway   Mallampati: III  Neck ROM: full   Pulmonary - negative ROS and normal exam                          Cardiovascular - normal exam  Exercise tolerance: > or = 4 METS  (+) , hypercholesterolemia,      Neuro/Psych - negative ROS     Endo/Other    (+) diabetes mellitus type 2, obesity,      GI/Hepatic/Renal    (+) GERD well controlled,        Other findings: Had sore throat and dysphagia after one surgery and someone told her the ETT was too big. She requests a small ETT.  Has had subsequent surgeries w/o issue.  Hg 13.2, K 4.8, GFR>60.      Dental - normal exam                        Anesthesia Plan  Planned anesthetic: general endotracheal  Magnesium, ofirmev if tylenol not recently given, toradol if ok with surgeon.  Use 6 or 7 ETT.  ASA 2   Induction: intravenous   Anesthetic plan and risks discussed with: patient  Anesthesia plan special considerations: increased risk of difficult airway, antiemetics,   Post-op plan: routine recovery

## 2021-06-02 NOTE — TELEPHONE ENCOUNTER
Per Dr. Benavides's request, I called Shireen to help her schedule a PET CT.  She is scheduled for her PET on Tuesday, 11-5-19 at 1245 at [facility].  Reviewed PET prep with her.  Support provided.

## 2021-06-02 NOTE — ANESTHESIA CARE TRANSFER NOTE
Last vitals:   Vitals:    10/24/19 1450   BP: (!) 187/79   Pulse:    Resp:    Temp:    SpO2:      Patient's level of consciousness is drowsy  Spontaneous respirations: yes  Maintains airway independently: yes  Dentition unchanged: yes  Oropharynx: oropharynx clear of all foreign objects    QCDR Measures:  ASA# 20 - Surgical Safety Checklist: WHO surgical safety checklist completed prior to induction    PQRS# 430 - Adult PONV Prevention: 4558F - Pt received => 2 anti-emetic agents (different classes) preop & intraop  ASA# 8 - Peds PONV Prevention: NA - Not pediatric patient, not GA or 2 or more risk factors NOT present  PQRS# 424 - Екатерина-op Temp Management: 4559F - At least one body temp DOCUMENTED => 35.5C or 95.9F within required timeframe  PQRS# 426 - PACU Transfer Protocol: - Transfer of care checklist used  ASA# 14 - Acute Post-op Pain: ASA14A - Patient experienced pain >= 7 out of 10

## 2021-06-02 NOTE — TELEPHONE ENCOUNTER
Called Shireen to follow up and schedule new medical and radiation oncology consults as ordered by Dr. Benavides. Appointment with Dr. Deal was scheduled on Monday, 11/4, at 930, and on Wednesday, 11/6, 10:30  with Dr. Kaye, [facility]. New patient letter/map with appointment details, health history form to complete, medication form to update and person-to person communication form were all sent to patient. Nurse navigator role was introduced and letter was enclosed with further explanation. Shireen has writer's contact information for future correspondence. We plan to meet when she is al clinic for oncology consults. Of note, she is scheduled for PET scan on 11/5. Dr. Deal would still like to meet with her on 11/4 and will adjust the treatment plan, after PET. Shireen was updated and would still like to meet with Dr. Deal on 11/4....La Wallace RN

## 2021-06-03 VITALS
SYSTOLIC BLOOD PRESSURE: 185 MMHG | TEMPERATURE: 98.3 F | OXYGEN SATURATION: 97 % | BODY MASS INDEX: 36.77 KG/M2 | WEIGHT: 220.7 LBS | HEIGHT: 65 IN | DIASTOLIC BLOOD PRESSURE: 78 MMHG | HEART RATE: 84 BPM

## 2021-06-03 VITALS — HEIGHT: 64 IN | BODY MASS INDEX: 38.11 KG/M2

## 2021-06-03 VITALS — WEIGHT: 222 LBS | HEIGHT: 64 IN | BODY MASS INDEX: 37.9 KG/M2

## 2021-06-03 VITALS
SYSTOLIC BLOOD PRESSURE: 172 MMHG | BODY MASS INDEX: 37.66 KG/M2 | WEIGHT: 219.4 LBS | OXYGEN SATURATION: 97 % | DIASTOLIC BLOOD PRESSURE: 80 MMHG | TEMPERATURE: 98.4 F | HEART RATE: 80 BPM

## 2021-06-03 VITALS — BODY MASS INDEX: 37.9 KG/M2 | WEIGHT: 222 LBS | HEIGHT: 64 IN

## 2021-06-03 VITALS — HEIGHT: 64 IN | BODY MASS INDEX: 37.9 KG/M2 | WEIGHT: 222 LBS

## 2021-06-03 VITALS — WEIGHT: 219 LBS | BODY MASS INDEX: 37.39 KG/M2 | HEIGHT: 64 IN

## 2021-06-03 NOTE — PROGRESS NOTES
Patient is here for consultation of breast cancer.  Accompanied by spouse, [place].  Bianka Garcia RN

## 2021-06-03 NOTE — TELEPHONE ENCOUNTER
Spoke with Shireen to follow up after her recent med onc consult. She continues to struggle with the decision of whether or not she wants  to proceed with chemo as her oncotype is 18 and benefit of chemotherapy cannot be excluded but is not clearly recommended. Clarified that if she elected to move forward with chemo, writer would contact Dr. Kaye's team to notify them of her decision and she would be scheduled for further discussion of regimen, chemo class, discussion of if port would be needed,etc. Further clarified that if she elects to have chemo, this would happen prior to radiation treatment. When she was in consult with Dr. Deal, 6 weeks of radiation was recommended. She is wondering if Dr. Deal would choose a shorter protocol if she has adjuvant chemo. IB sent to Dr. Deal to pose this question. Writer will be in touch with Shireen regarding Dr. Deal's response. Shireen has apt with Dr. Culp on 11/11 for possible drain removal.

## 2021-06-03 NOTE — TELEPHONE ENCOUNTER
Called Shireen to check in on how she is doing. She had her follow up with Dr. Culp this past week and her drains were removed. She was instructed not to do any exercises with lifting, carrying, raising arms above her head so she has not pursued the exercises that Dr. Deal has given her at this time. She has been careful with her posture and is trying to resist curving her shoulders. She will return in 2 weeks to follow up with Dr. Culp again. Dr. Culp prepared her that she is still 6 weeks away from being fully healed and able to start radiation. Instructed that as her healing continues to be assessed by Dr. Culp, to keep us up to date so that she can be scheduled for a CT SIM a couple weeks prior to her anticipated start date. Also relayed that she will need to be able to remain in treatment position with arms over her head. She verbalized understanding.  We discussed her thoughts on chemo and at this time she has decided not to pursue chemo as she is not sure that the benefit clearly outweighs the risks/side effects. If Dr. Kaye strongly recommends that she move forward with chemo she would reconsider her decision. She would like writer to communicate her decision to Dr. Kaye's team. We will plan to talk the week of Thanksgiving.  **IB with update sent to [place], Nursing team for Dr. Kaye.

## 2021-06-03 NOTE — PROGRESS NOTES
Met with Shireen when she came to clinic for her radiation oncology consult. We had met [facility]. She is s/p bilateral mastectomies on 10/24/19, drains are still in place. She has follow up apt with Dr. Culp on 11/11 and is hoping that the drains will be removed at that time. CT SIM will be delayed until drains are removed. She reports that she  has not had any PT. She has had restrictions post surgery and feels that she has some arm tightness. Dr. Deal gave her exercises to improve ROM prior to radiation start. She has been driving, mainly on back roads, and recently started freeway driving. She does not feel that she will need assistance with transportation to her apts but has a son-in-law that is available to help if needed. Support group information was reviewed in the past and declines at this time. PET scan is scheduled on 11/5. She has consult scheduled with Dr. Kaye on 11/6 and we plan to connect again at that time. She has writer's contact information for future correspondence.

## 2021-06-03 NOTE — PROGRESS NOTES
Patient here ambulatory for radiation consult for her breast cancer.  Patient had bilateral mastectomies and states she still has drains in place.  Plan is for her to have a PET tomorrow and see medical oncology Wednesday this week.  25 minutes spent in review of radiation process and potential side effects.  Written information given for review: ACS RT book, RT to breast handouts, [facility] RT handouts, insurance PA handout, HE class schedule, dietician handout, team photo sheet and cancer care welcome letter.  Seen by Dr. Deal.  Plan RTC for follow up and/or CT simulation as directed by physician.

## 2021-06-03 NOTE — PROGRESS NOTES
Followed up with Shireen when she came to clinic for her medical oncology consult with Dr. Kaye. She feels overwhelmed today with the choices that were presented and making a decision on how she wants to move forward. Her oncotype score was 18 and Chemo benefit cannot be excluded. She will consider her options the next couple of weeks while she continues to heal from surgery, drains still in place. If she elects to have chemo, she will complete prior to radiation treatment. She had PET yesterday, no metastatic disease. She has writer's contact information and we will be in touch.

## 2021-06-03 NOTE — PROGRESS NOTES
[facility] Radiation Oncology Consult Note    Patient: Shireen Simpson  MRN: [id number]  Date of Service: 11/04/2019    Assessment:     1. Malignant neoplasm of lower-outer quadrant of right breast of female, estrogen receptor positive (H)       Cancer Staging  No matching staging information was found for the patient.  ECOG Peformance Status  ECOG Performance Status: 1  Distress Assessment Score: 1    Impression/Plan:   64 y.o. female s/p bilateral mastectomy with bilateral reconsruction direct to implant with alloderm/silicone prepetoral placement implants, right breast, IDC grade III, total >42mm, DCIS grade III, >70mm, all margins negative, 3/7 LN pos, ER/SC pos, HER-2 negative.     1. We discussed risks and benefits, in detail, of radiation therapy including side effects as described below. The patient voices understanding of the information discussed and wishes to proceed forward with treatment.  2. The patient will need 2-3 weeks to have drains removed and heal. Additionally she has yet to undergo PET CT for staging. Anticipate to begin in 4 weeks.   3. ROM exercises for next 5-6 months.  4. Drain still in, 25cc since emptied 3 hours ago.   5. Had immediate implants, will have to see how to avoid contralateral implant.      Face to face time  60 minutes with > 75% spent on consultation, education and coordination of care.    Intent of Therapy: Curative  Patient on concurrent Herceptin No  Adjuvant hormonal therapy: Yes Agent: TBD  Start: Following radiation  Chemotherapy: N/A   Intended fractionation schedule:  TBD per PET    Breast cancer risk factors:   No obstetric history on file.  LMP Dates from Last 4 Encounters:   No data found for LMP        We recommend adjuvant radiation to decrease chance of local recurrence to the single digits. ROM stretches and skin care were discussed with the patient. They understand that these maneuvers need to continue for 6 months following completion of radiation as skin  and muscle fibrosis continue to form for weeks to months following completion of therapy.      Side effects that may occur during or within weeks after radiation therapy      Fatigue and general weakness    Darkening, irritation, itchiness, redness, dryness, erythema, peeling, scabbing, ulceration and contraction of the skin of the chest    Loss of armpit hair    Lung irritation    Decrease in appetite    Side effects that may occur months or years after radiation therapy      Development of another tumor or cancer    Thickening, telangiectasias (development of spider like blood vessels in the skin) and ulceration of the skin of the chest    Firming, fibrosis (scar tissue), fat necrosis, and distortion of the chest wall    Poor healing after a trauma or surgery in the irradiated area    Nerve damage resulting in loss of arm strength and sensation    Coronary artery blockage causing angina pain or a heart attack    Lung inflammation of fibrosis causing cough, fever and shortness of breath    Fracture of the ribs    Swelling of an arm and hand    Decreased range of motion of the right upper extremity which may result in shoulder/rotator cuff injury.          The risks, benefits and alternatives to radiation therapy were outlined with the patient. All questions were answered and a consent was signed.      Subjective:      HPI: Shireen Simpson is a 64 y.o. female s/p bilateral mastectomy with right breast IDC and extensive DCIS.     The patient had a screening mammogram performed showed an abnormal masses in her right breast. A right breast needle US biopsy with LN biopsy was performed on 7/25/2019, pathology reurned with IDC grade II, DCIS grade III, ER/MI pos, HER-2 neg, axillary node negative.     The patient underwent a lumpectomy with SLN biopsy on 9/06/2019, pathology retunred with IDC grade II, multifocal 30 mm, DCIS grade III, present over a 70 mm area from superior and inferior, all margins were negative,  (less than 1mm for both IDC and DCIS), 1/2 LN pos. Oncotype 18.     She then underwent a bilateral mastectomy, with bilateral reconsruction direct to implant with alloderm/silicone prepetoral placement implants on 10/24/2019, final pathology of th left breast was negative for malignancy. Final pathology of the right breast showed residual IDC grade 2-3, approximately 12 x 12 x 10 mm, margins negative (7mm), 2/5 LN pos (largest 9mm in a 2.2 cm LN), extranodal soft tissue extension not identified.     The patient presents today to discuss radiaiton treatment. She has no complaints today. Drains are in. A PET CT is scheduled for tomorrow.     Prior Radiation: No  Concurrent Chemotherapy: No    Current Outpatient Medications   Medication Sig Dispense Refill     acetaminophen (TYLENOL) 500 MG tablet Take 500 mg by mouth every 6 (six) hours as needed for pain.       anastrozole (ARIMIDEX) 1 mg tablet TAKE ONE TABLET BY MOUTH ONCE DAILY 30 tablet 0     ascorbic acid, vitamin C, (ASCORBIC ACID WITH ISABEL HIPS) 500 MG tablet Take 500 mg by mouth daily.       aspirin 81 MG EC tablet Take 81 mg by mouth daily.       CALCIUM CARBONATE-VITAMIN D3 ORAL Take 1 tablet by mouth Daily at 8:00 am.. Calcium carbonate 600 mg             clindamycin (CLEOCIN) 300 MG capsule TAKE ONE CAPSULE BY MOUTH THREE TIMES DAILY FOR 3 WEEKS  1     cyclobenzaprine (FLEXERIL) 5 MG tablet Take 1-2 tablets by mouth every 8 hours as needed  1     ferrous sulfate 325 (65 FE) MG tablet 2 tablets Daily at 8:00 am..              glipiZIDE (GLUCOTROL) 10 MG tablet Take 10 mg by mouth 2 (two) times a day before meals.              linagliptin 5 mg Tab Take 5 mg by mouth daily.       metFORMIN (GLUCOPHAGE-XR) 500 MG 24 hr tablet Take 1,000 mg by mouth 2 (two) times a day with meals.              multivitamin (ONE A DAY) per tablet Take 1 tablet by mouth.       omeprazole (PRILOSEC) 20 MG capsule Take 20 mg by mouth Daily before breakfast.       pioglitazone  (ACTOS) 30 MG tablet Take 30 mg by mouth daily.       PRESERVISION AREDS 14,320-226-200 unit-mg-unit cap Take 2 capsules by mouth Daily at 8:00 am..         5     rosuvastatin (CRESTOR) 10 MG tablet Take 10 mg by mouth bedtime.       HYDROcodone-acetaminophen 5-325 mg per tablet Take 1 tablet by mouth every 4 (four) hours as needed for pain. 25 tablet 0     omega 3-dha-epa-fish oil (FISH OIL) 900-1,400 mg CpDR 2 capsules Daily at 8:00 am.. Triple strength             oxyCODONE-acetaminophen (PERCOCET/ENDOCET) 5-325 mg per tablet        No current facility-administered medications for this visit.      Past Medical History:   Diagnosis Date     Anemia     Borderline     Cancer (H)      Diabetes mellitus (H)      History of anesthesia complications     Breathing Tube Caused Difficulty Breathing Post Surgery     History of transfusion      Past Surgical History:   Procedure Laterality Date     AXILLARY NODE DISSECTION Right 10/24/2019    Procedure: right axillary lymph node sampling;  Surgeon: Andree Benavides MD;  Location: [facility];  Service: General     BREAST RECONSTRUCTION Bilateral 10/24/2019    Procedure: BILATERAL FIRST STAGE RECONSTRUCTION DIRECT TO IMPLANT WITH ALLODERM AND SILICONE IMPLANTS;  Surgeon: Michelle Culp MD;  Location: [facility];  Service: Plastics     CHOLECYSTECTOMY       HYSTERECTOMY       NJ MASTECTOMY, MODIFIED RADICAL Bilateral 10/24/2019    Procedure: Bilateral mastectomies;  Surgeon: Andree Benavides MD;  Location: [facility];  Service: General     [place] MASTECTOMY,PARTIAL,  WITH AXILLARY LYMPHADENECTOMY Right 9/6/2019    Procedure: Right Lumpectomy after Wire Localization; [place] Lymph Node Biopsy;  Surgeon: Andree Benavides MD;  Location: [facility];  Service: General     TONSILLECTOMY       US BREAST LOC W SENT NODE INJ RIGHT Right 9/6/2019     Augmentin [amoxicillin-pot clavulanate]; Iodinated contrast media; Iodine; Penicillins; Shellfish containing  products; Lincoject; Neosporin (gzr-mbf-ykqxh) [neomycin-bacitracnzn-polymyxnb]; and Teroderm plus  [cream base no.221 (bulk)]  No family history on file.  Social History     Socioeconomic History     Marital status:      Spouse name: Not on file     Number of children: Not on file     Years of education: Not on file     Highest education level: Not on file   Occupational History     Not on file   Social Needs     Financial resource strain: Not on file     Food insecurity:     Worry: Not on file     Inability: Not on file     Transportation needs:     Medical: Not on file     Non-medical: Not on file   Tobacco Use     Smoking status: Never Smoker     Smokeless tobacco: Never Used   Substance and Sexual Activity     Alcohol use: Yes     Frequency: Monthly or less     Drug use: Never     Sexual activity: Not on file   Lifestyle     Physical activity:     Days per week: Not on file     Minutes per session: Not on file     Stress: Not on file   Relationships     Social connections:     Talks on phone: Not on file     Gets together: Not on file     Attends Gnosticism service: Not on file     Active member of club or organization: Not on file     Attends meetings of clubs or organizations: Not on file     Relationship status: Not on file     Intimate partner violence:     Fear of current or ex partner: Not on file     Emotionally abused: Not on file     Physically abused: Not on file     Forced sexual activity: Not on file   Other Topics Concern     Not on file   Social History Narrative     Not on file        Review of Systems:        General  General (WDL): Exceptions to WDL  Fatigue: Yes - Recent (Less than 3 months)(recovery from surgery)  EENT  ENT (WDL): Exceptions to WDL  Hoarseness: Yes - Recent (Less than 3 months)(after surgery)  Respiratory       Respiratory (WDL): All respiratory elements are within defined limits  Cardiovascular  Cardiovascular (WDL): All cardiovascular elements are within defined  limits  Endocrine  Endocrine (WDL): All endocrine elements are within defined limits  Gastrointestinal  Gastrointestinal (WDL): Exceptions to WDL  Nausea and Vomiting: Yes - Recent (Less than 3 months)(with anesthesia)  Musculoskeletal  Musculoskeletal (WDL): Exceptions to WDL  Difficulty to lie flat for more than 30 minutes: Yes - Recent (Less than 3 months)(back gets tired and achy)  Integumentary                  Neurological  Neurological (WDL): All neurological elements are within defined limits  Dominant Hand: Right  Psychological/Emotional   Psychological/Emotional (WDL): Exceptions to WDL  Daytime sleepiness: Yes - Recent (Less than 3 months)  Hematological/Lymphatic  Hematological/Lymphatic (WDL): Exceptions to WDL  Epistaxis: Yes - Chronic (Greater than 3 months)  Dermatologic  Dermatologic (WDL): Exceptions to WDL  Healing Incision: Yes - Recent (Less than 3 months)(bilateral mastectomy)  Genitourinary/Reproductive  Genitourinary/Reproductive (WDL): All genitourinary/reproductive elements are within defined limits  Reproductive     Pain              Currently in Pain: Yes  Pain Score (Initial OR Reassessment): 1  Pain Frequency: Intermittent  Location: incisional  Pain Intervention(s): Home medication  Response to Interventions: good   AUA Assessment                    Accompanied by         Objective:     Physical Exam    Vitals:    11/04/19 0955   BP: 172/80   Pulse: 80   Temp: 98.4  F (36.9  C)   TempSrc: Oral   SpO2: 97%   Weight: 219 lb 6.4 oz (99.5 kg)       General: No obvious distress, comfortable appearing.  Cooperative in conversation.   Head: Normocephalic, without obvious abnormality, atraumatic  Neck: no adenopathy and supple, symmetrical, trachea midline  Lungs: clear to auscultation bilaterally  CW:  expected post operative changes, no masses skin changes suggestive of recurrence or infection. Drains in place. 25 cc bloody but otherwise clear fluid, non purulent.   Heart: regular rate and  rhythm  Skin: Skin color, texture, turgor normal. No rashes or lesions  Lymph nodes: Cervical, supraclavicular, and axillary nodes normal.  MSK: full ROM.   Neurologic: Grossly normal  Psych: affect normal, thought content appropriate.        Recent Labs:   Recent Results (from the past 168 hour(s))   Renal Function Profile   Result Value Ref Range    Albumin 3.7 3.5 - 5.0 g/dL    Calcium 9.5 8.5 - 10.5 mg/dL    Phosphorus 2.5 2.5 - 4.5 mg/dL    Glucose 307 (H) 70 - 125 mg/dL    BUN 13 8 - 22 mg/dL    Creatinine 0.77 0.60 - 1.10 mg/dL    Sodium 140 136 - 145 mmol/L    Potassium 3.8 3.5 - 5.0 mmol/L    Chloride 103 98 - 107 mmol/L    CO2 23 22 - 31 mmol/L    Anion Gap, Calculation 14 5 - 18 mmol/L    GFR MDRD Af Amer >60 >60 mL/min/1.73m2    GFR MDRD Non Af Amer >60 >60 mL/min/1.73m2       Imaging: Imaging results 30 days: No results found.    Pathology:   Results for orders placed or performed during the hospital encounter of 10/24/19 (from the past 8760 hour(s))   Surgical Pathology Exam   Result Value    Case Report      Surgical Pathology                                Case: [id number]                                    Authorizing Provider:  Andree Benavides MD        Collected:           10/24/2019 1309              Ordering Location:     [facility] OR     Received:            10/24/2019 1345              Pathologist:           Juana Jung MD                                                         Specimens:   A) - Breast, Left, fresh for permanent, stitch to lateral                                           B) - Breast, Right, right breast with axillary lymph node fresh for permanent, stitch               to lateral                                                                                 Final Diagnosis      A) LEFT BREAST, MASTECTOMY:       1) NO EVIDENCE OF INVASIVE MALIGNANCY         2) PROLIFERATIVE FIBROCYSTIC CHANGES WITH FOCAL ATYPICAL DUCTAL HYPERPLASIA            AND ATYPICAL  "LOBULAR HYPERPLASIA       3) NIPPLE WITH NO EVIDENCE OF PAGET'S DISEASE OR DERMAL LYMPHATIC TUMOR SPREAD    B) RIGHT BREAST AND AXILLARY LYMPH NODES, MASTECTOMY:         1) RESIDUAL INVASIVE CARCINOMA NOT IDENTIFIED        2) RESIDUAL DUCTAL CARCINOMA IN SITU              a)  SIZE:  APPROXIMATELY 12 X 12 X 10 MM             b)  PATTERNS:  SOLID AND MICROPAPILLARY             c)  NUCLEAR GRADE:  2-3             d)  MARGINS:  NEGATIVE; 7 MM FROM SKIN SURFACE AND 10 MM FROM DEEP MARGIN       3) METASTATIC DUCTAL CARCINOMA PRESENT IN TWO OF FIVE LYMPH NODES (2 OF 5)             a)  LARGEST METASTASES 9 MM IN A 2.2 CM LYMPH NODE; EXTRANODAL SOFT TISSUE                   EXTENSION NOT IDENTIFIED             b)  ISOLATED TUMOR CELLS PRESENT IN ONE LYMPH NODE       4) BIOPSY CAVITY WITH FIBROTIC SCAR FORMATION, FAT  NECROSIS AND FOREIGN BODY             GRANULOMATOUS INFLAMMATION        5) PROLIFERATIVE FIBROCYSTIC CHANGES WITH USUAL DUCTAL HYPERPLASIA        6) NIPPLE WITH NO EVIDENCE OF PAGET'S DISEASE OR DERMAL LYMPHATIC TUMOR SPREAD    REVISED STAGE: pT2(m) pN1a    MCSS    Microscopic Description      Microscopic examination performed, substantiating the above diagnosis.    Clinical Information      Pre-op Diagnosis: Breast cancer, right (H) [C50.911]    Gross Description      A) Received unfixed, labeled with the patient's name, Shireen Simpson, and \"left breast,\" is a 1090-gram, 24.6 cm from superior to inferior, 19.8 cm from medial to lateral and 6.7 cm from anterior to posterior product of a left breast mastectomy. The specimen has a black suture designating the lateral margin, per the surgeon. The tissue consists of yellow-white to pink fibrofatty tissue which is anteriorly surfaced by a 14.3 x 5.1 cm, tan-white, wrinkled skin ellipse. The ellipse has a central 1.0 x 0.4 cm, everted nipple. No suspicious cutaneous lesions are identified. The specimen is inked black on the deep-posterior margin, blue on the soft " "tissue superior to the ellipse, and green on the soft tissue inferior to the ellipse. The specimen is serially sectioned from medial to lateral.    Sectioning displays predominantly homogeneous yellow lobulated fat occupying approximately 85% of the specimen. The remaining tissue consists of thin to focally dense bands of white-pink rubbery  fibrous tissue. No suspicious indurated lesions or granular foci are identified. No lymph nodes are identified upon sectioning or palpation. RS-10C    SUMMARY OF CASSETTES: A1-2) Nipple; A3) Lower outer quadrant; A4) Upper outer quadrant; A5) Upper inner quadrant; A6) Lower inner quadrant; A7-10) Mid breast.    NOTE: The specimen is placed into formalin at 1345, 10/24/19.     B) Received unfixed, labeled with the patient's name, and \"right breast and axillary lymph node,\" is a 909-gram, oriented, 21.6 cm from medial to lateral, 15.8 cm from superior to inferior, and 5.9 cm from anterior to posterior product of a right breast mastectomy. There is an attached 6.5 x 3.2 x 2.1 cm portion of axillary tail. The specimen consists of yellow-white to pink fibrofatty tissue which is anteriorly surfaced by 12.6 x 5.8 cm, tan-white, wrinkled skin ellipse. The ellipse has an inferiorly located, crescent-shaped, 2.6 x 0.2 cm scar. The scar extends to less than 0.1 cm of the inferior-medial,  peripheral cutaneous margin. The specimen is differentially inked black on the deep-posterior margin, blue on the soft tissue superior to the ellipse and green on the soft tissue inferior to the ellipse. The specimen is serially sectioned from lateral to medial.     Sectioning displays a white-pink to yellow-gray tissue-lined previous biopsy cavity measuring 5.8 x 2.9 x 2.7 cm. The cavity extends along the inferior aspect of the specimen and is 0.2 cm from the inferior margin, 0.3 cm from the inferior-anterior margin, 11.8 cm from the superior margin, 2.7 cm from the medial margin, 5.6 cm from the lateral " margin and is 5.2 cm from the nipple. This lesion is 1.7 cm from the nearest overlying skin and is 1.6 cm from the deep posterior margin. Gross tumor is not identified. This lesion has stellate, ill-defined borders. Multiple representative sections are submitted from lateral to medial.     The remaining cut surface displays predominantly unremarkable, yellow lobulated fat which  occupies approximately 75% of the specimen. Thin to focally dense bands of fibrous tissue make up the remainder of the cut surface.    Sectioning through the axillary tail displays several possible lymph nodes ranging from 0.2 cm - 1.8 cm. The lymph nodes are submitted in their entirety for evaluation. RS-22C    SUMMARY OF CASSETTES: B1-2) Nipple; B3-12) Representative previous biopsy cavity to differentially inked margins and skin; B13) Lower inner quadrant; B14) Upper inner quadrant; B15) Upper outer quadrant; B16) Lower outer quadrant; B17) Mid breast; B18-19) One lymph node quadrisected: B20) One lymph node trisected; B21) Single possible lymph node; B22) Two possible lymph nodes.     NOTE: The specimen is placed into formalin at 1345, 10/24/19. MAYE:walt  Total formalin fixation time:  8:15    Charges      CPT: 54515, 29491  ICD-10: C50.911    Cc: Michelle Culp MD    Result Flag Malignant (!)     Comment: SPECIMEN PROCESSING:    All histology slide preparation and stains; and cytology slide preparation, staining, and cytotechnologist screening done at [facility] are performed at [facility], [address] [place] [address], [id number], with final interpretation, frozen section analysis, and cytology adequacy assessment at indicated laboratory.             I, Debbie Deal MD personally performed the services described in this documentation, as scribed by Alejandro Presley in my presence, and it is both accurate and complete.    Signed by: Debbie Deal MD, MPH

## 2021-06-03 NOTE — PROGRESS NOTES
[facility] Hematology and Oncology Consult Note    Patient: Shireen Simpson  MRN: [id number]  Date of Service: 11/06/2019      Reason for Visit    Chief Complaint   Patient presents with     HE Cancer     Breast Cancer       Assessment/Plan    ECOG Performance   ECOG Performance Status: 1  Distress Assessment  Distress Assessment Score: No distress    #. Stage IB (pT2(3) pN1 M0) invasive ductal carcinoma of the right breast, multifocal, grade 2, ER strongly positive, NH moderately positive, HER-2 negative.  Oncotype DX recurrence score of 18, intermediate risk.  Post bilateral mastectomy and right axillary lymph node dissection   Reviewed the clinical course and pathology result in detail with the patient.  PET scan did not show any evidence of active cancer.  I discussed about adjuvant treatment option to decrease breast cancer recurrence and to improve overall survival.  I discussed with her that her Oncotype score is borderline and she has several high risk feature on pathology report with multifocal tumor, multiple lymph node involvement representing high risk of recurrence.  I reviewed the NCCN guidelines with the patient.  Oncotype DX recurrence score is an intermediate risk that not clear about chemotherapy benefit, however chemotherapy should be considered along with endocrine therapy in patient with RS equal or higher than 18.  I discussed that sequence of treatment as chemotherapy followed by radiation therapy and endocrine therapy will be interrupted during the chemotherapy.  She had an impression that she will complete radiation therapy first before chemotherapy.   I explained to her that the sequence of treatment would be chemotherapy followed by radiation therapy.     I reviewed the chemotherapy options of dose dense Adriamycin and Cytoxan followed by paclitaxel and docetaxel and cyclophosphamide regimen.  I would favor TC regimen because of her pre-existing cardiovascular risks, if she decides to  proceed with chemotherapy.  She will think about it and will let us know with her decision.   Continue anastrozole at this point.   She reported that she completed DEXA scan around June 2019 at [facility].  I requested the medical record but the do not have the DEXA scan completed in their system.  I will follow-up with the patient in the next visit.    #.  Obesity  #.  Diabetes type 2  #.  Hypercholesterolemia    All extensive questions were answered to their stated satisfaction.    Problem List    1. Malignant neoplasm of upper-outer quadrant of right breast in female, estrogen receptor positive (H)       ______________________________________________________________________________    Staging History    Cancer Staging  Breast cancer, right (H)  Staging form: Breast, AJCC 8th Edition  - Clinical: No stage assigned - Unsigned  - Pathologic stage from 11/6/2019: Stage IB (pT2(3), pN1, cM0, G2, ER+, VT+, HER2-, Oncotype DX score: 18) - Signed by Tasha Kaye MD on 11/6/2019      History    Mrs. Shireen Simpson is a very pleasant 64-year-old female accompanied by her  today.  She was initially diagnosed with right breast cancer by screening mammogram in July 2019.  It showed at least 2 irregular mass in the right breast, one spiculated mass laterally in the posterior depth about 13 mm and another spiculated mass in the central to the slight lateral breast middle depth.  Ultrasound showed 3 sonographic masses in the right breast 7 o'clock position of 9 x 10 x 6 mm, adjacent second lesion of 8 x 7 x 12 mm, Fallert lesion of 9 x 8 x 12 mm.  The right axilla was evaluated and it showed 12 x 7 x 12 mm lymph node with a fatty hilum and fairly thickened lymph node cortex measuring 7 mm in thickness.    She underwent core needle biopsy of the right breast mass and a right axillary lymph node on 7/25/2019 and they were positive for invasive ductal carcinoma, grade 2, ER strongly positive (99%), VT  moderately positive (40%), HER-2 negative (1+) by IHC.    She started anastrozole on 8/8/2019.    9/6/2019-right breast lumpectomy and right sentinel lymph node biopsy completed.  It showed multifocal invasive ductal carcinoma, grade 2 with largest size of 30 mm.  Margins were uninvolved but less than 1 mm superior.  She has DCIS with EIC positive over 70 mm with less than 1 mm superior and inferior margin.  1 of the 2 axillary lymph node was positive for carcinoma.  10/24/2019-bilateral mastectomy and additional right axillary lymph node dissection.  Additional 5 lymph nodes were removed and 2 were positive for malignancy.    She is .  They have 2 daughters.  She retired about 3 years ago.  She is moderately active.  Never smoker.  She drinks alcohol about 2-3 times per year and special occasions.    Past History    Past Medical History:   Diagnosis Date     Anemia     Borderline     Cancer (H)      Diabetes mellitus (H)      History of anesthesia complications     Breathing Tube Caused Difficulty Breathing Post Surgery     History of transfusion     Family History   Problem Relation Age of Onset     Cancer Sister 60        Breast Cancer     Cancer Maternal Aunt         Stomach     Cancer Maternal Uncle         Lung     Cancer Paternal Aunt         Lung     Cancer Maternal Aunt         Lung      Past Surgical History:   Procedure Laterality Date     AXILLARY NODE DISSECTION Right 10/24/2019    Procedure: right axillary lymph node sampling;  Surgeon: Andree Benavides MD;  Location: [facility];  Service: General     BREAST RECONSTRUCTION Bilateral 10/24/2019    Procedure: BILATERAL FIRST STAGE RECONSTRUCTION DIRECT TO IMPLANT WITH ALLODERM AND SILICONE IMPLANTS;  Surgeon: Michelle Culp MD;  Location: [facility] [place];  Service: Plastics     CATARACT EXTRACTION       CHOLECYSTECTOMY       HYSTERECTOMY       DE MASTECTOMY, MODIFIED RADICAL Bilateral 10/24/2019    Procedure: Bilateral mastectomies;   Surgeon: Andree Benavides MD;  Location: [facility] OR;  Service: General     DC MASTECTOMY,PARTIAL,  WITH AXILLARY LYMPHADENECTOMY Right 9/6/2019    Procedure: Right Lumpectomy after Wire Localization; Davis Lymph Node Biopsy;  Surgeon: Andree Benavides MD;  Location: [facility];  Service: General     TONSILLECTOMY       US BREAST LOC W SENT NODE INJ RIGHT Right 9/6/2019    Social History     Socioeconomic History     Marital status:      Spouse name: Not on file     Number of children: Not on file     Years of education: Not on file     Highest education level: Not on file   Occupational History     Not on file   Social Needs     Financial resource strain: Not on file     Food insecurity:     Worry: Not on file     Inability: Not on file     Transportation needs:     Medical: Not on file     Non-medical: Not on file   Tobacco Use     Smoking status: Never Smoker     Smokeless tobacco: Never Used   Substance and Sexual Activity     Alcohol use: Yes     Frequency: Monthly or less     Drug use: Never     Sexual activity: Not on file   Lifestyle     Physical activity:     Days per week: Not on file     Minutes per session: Not on file     Stress: Not on file   Relationships     Social connections:     Talks on phone: Not on file     Gets together: Not on file     Attends Christianity service: Not on file     Active member of club or organization: Not on file     Attends meetings of clubs or organizations: Not on file     Relationship status: Not on file     Intimate partner violence:     Fear of current or ex partner: Not on file     Emotionally abused: Not on file     Physically abused: Not on file     Forced sexual activity: Not on file   Other Topics Concern     Not on file   Social History Narrative     Not on file        Allergies    Allergies   Allergen Reactions     Augmentin [Amoxicillin-Pot Clavulanate] Hives     Iodinated Contrast Media Hives, Shortness Of Breath and Swelling     Iodine  Hives, Shortness Of Breath and Swelling     Penicillins Hives, Shortness Of Breath and Swelling     Shellfish Containing Products Nausea And Vomiting     Lincoject Nausea And Vomiting and Swelling     Neosporin (Kxm-Lvu-Hiuam) [Neomycin-Bacitracnzn-Polymyxnb] Other (See Comments)     Red eyes, puffy     Teroderm Plus  [Cream Base No.221 (Bulk)] Nausea And Vomiting and Swelling       Review of Systems    Constitutional  Constitutional (WDL): Exceptions to WDL  Fatigue: Fatigue relieved by rest(surgery recovery)  Fever: None  Chills: None  Weight Gain: None  Weight Loss: None  Neurosensory  Neurosensory (WDL): All neurosensory elements are within defined limits  Eye   Eye Disorder (WDL): All eye disorder elements are within defined limits  Ear  Ear Disorder (WDL): All ear disorder elements are within defined limits  Cardiovascular  Cardiovascular (WDL): All cardiovascular elements are within defined limits  Pulmonary  Respiratory (WDL): Within Defined Limits  Gastrointestinal  Gastrointestinal (WDL): Exceptions to WDL  Anorexia: None  Constipation: None  Diarrhea: None  Dysphagia: None  Esophagitis: None(controlled)  Nausea: Loss of appetite without alteration in eating habits(controlled)  Pharyngitis: None  Vomiting: None  Dysgeusia: None  Dry Mouth: None  Genitourinary  Genitourinary (WDL): All genitourinary elements are within defined limits  Lymphatic  Lymph (WDL): All lymph disorder elements are within defined limits  Musculoskeletal and Connective Tissue  Musculoskeletal and Connetive Tissue Disorders (WDL): Exceptions to WDL  Arthralgia: Mild pain(tailbone from sleeping on back)  Bone Pain: None  Muscle Weakness : None  Myalgia: Mild pain(breast with surgical implants)  Integumentary  Integumentary (WDL): Exceptions to WDL(surgical breast implants, drain x2)  Alopecia: None  Rash Maculo-Papular: None  Pruritus: None  Urticaria: None  Palmar-Plantar Erythrodysesthesia Syndrome: None  Flushing: None  Patient  "Coping  Patient Coping: Open/discussion  Distress Assessment  Distress Assessment Score: No distress  Accompanied by  Accompanied by: Family Member  Oral Chemo Adherence       Physical Exam    Recent Vitals 11/6/2019   Height 5' 4.5\"   Weight 220 lbs 11 oz   BSA (m2) 2.13 m2   /78   Pulse 84   Temp 98.3   Temp src 1   SpO2 97   Some recent data might be hidden     General: alert, awake, not in acute distress  HEENT: Head: Normal, normocephalic, atraumatic.  Eye: Normal external eye, conjunctiva, lids cornea, RONY.  Ears:  Non-tender.  Nose: Normal external nose, mucus membranes and septum.  Pharynx: Dental Hygiene adequate. Normal buccal mucosa. Normal pharynx.  Neck / Thyroid: Supple, no masses, nodes, nodules or enlargement.  Lymphatics: No abnormally enlarged lymph nodes.  Chest: Normal chest wall and respirations. Clear to auscultation.  Breasts: Lateral breast implant.  Drain present bilaterally.  Heart: S1 S2 RRR, no murmur.   Abdomen: abdomen is soft without significant tenderness, masses, organomegaly or guarding  Extremities: normal strength, tone, and muscle mass  Skin: normal. no rash or abnormalities  CNS: non focal.    Lab Results    Recent Results (from the past 168 hour(s))   POCT Glucose   Result Value Ref Range    Glucose 86 70 - 139 mg/dL       Imaging Results    Nm Pet Ct Skull To Mid Thigh    Result Date: 11/5/2019  EXAM: NM PET CT SKULL TO MID THIGH LOCATION: [facility] DATE/TIME: 11/5/2019 2:11 PM INDICATION: Initial treatment strategy and staging for malignant neoplasm of upper outer quadrant of right breast in female, estrogen receptor positive. COMPARISON: Mammogram and breast ultrasound dated 09/06/2019 TECHNIQUE: Serum glucose level 86 mg/dL. One hour post intravenous administration of 12.0 mCi F-18 FDG, PET imaging was performed from the skull base to the mid thighs utilizing attenuation correction with concurrent axial CT and PET/CT image fusion. Dose reduction techniques " were used. FINDINGS: Status post bilateral mastectomies with implant reconstruction and right axillary lymph node dissection with mildly FDG avid soft tissue stranding in the right axilla favored to represent post therapeutic change. The remaining FDG uptake is physiologic from the skull base to mid thigh. Postoperative change of the bilateral lenses. Mild coronary artery calcium. Scarring/atelectasis in the lingula. Splenule. Cholecystectomy. Sigmoid diverticulosis. Fat-containing umbilical hernias. Hysterectomy. Multilevel degenerative changes of the spine.     No evidence of active neoplasm.      Signed by: Tasha Kaye MD

## 2021-06-03 NOTE — TELEPHONE ENCOUNTER
Called Shireen to follow up on Dr. Deal's response to her question regarding the recommended length of radiation if she chooses to have chemo prior. Per Dr. Deal she would still recommend  conventional fractionation, better late cosmesis. Shireen verbalized understanding. She has follow up apt with Dr. Culp today and hopes to have her drains removed. We will talk again soon about her thoughts on treatment.

## 2021-06-04 VITALS
HEART RATE: 70 BPM | TEMPERATURE: 97.8 F | OXYGEN SATURATION: 100 % | SYSTOLIC BLOOD PRESSURE: 155 MMHG | DIASTOLIC BLOOD PRESSURE: 69 MMHG | BODY MASS INDEX: 36.4 KG/M2 | WEIGHT: 215.4 LBS

## 2021-06-04 VITALS
OXYGEN SATURATION: 98 % | TEMPERATURE: 98.7 F | BODY MASS INDEX: 37.16 KG/M2 | WEIGHT: 219.9 LBS | SYSTOLIC BLOOD PRESSURE: 162 MMHG | DIASTOLIC BLOOD PRESSURE: 74 MMHG | HEART RATE: 80 BPM

## 2021-06-04 VITALS
DIASTOLIC BLOOD PRESSURE: 82 MMHG | OXYGEN SATURATION: 99 % | TEMPERATURE: 98.3 F | WEIGHT: 217.5 LBS | SYSTOLIC BLOOD PRESSURE: 137 MMHG | HEART RATE: 72 BPM | BODY MASS INDEX: 36.76 KG/M2

## 2021-06-04 VITALS
DIASTOLIC BLOOD PRESSURE: 88 MMHG | HEART RATE: 75 BPM | WEIGHT: 218.3 LBS | BODY MASS INDEX: 36.89 KG/M2 | OXYGEN SATURATION: 99 % | TEMPERATURE: 97.9 F | SYSTOLIC BLOOD PRESSURE: 145 MMHG

## 2021-06-04 VITALS
WEIGHT: 217.3 LBS | TEMPERATURE: 97.8 F | SYSTOLIC BLOOD PRESSURE: 176 MMHG | HEART RATE: 69 BPM | OXYGEN SATURATION: 99 % | BODY MASS INDEX: 36.72 KG/M2 | DIASTOLIC BLOOD PRESSURE: 86 MMHG

## 2021-06-04 VITALS
SYSTOLIC BLOOD PRESSURE: 161 MMHG | DIASTOLIC BLOOD PRESSURE: 77 MMHG | BODY MASS INDEX: 35.49 KG/M2 | HEART RATE: 67 BPM | WEIGHT: 210 LBS | OXYGEN SATURATION: 97 % | TEMPERATURE: 98.2 F

## 2021-06-04 VITALS
DIASTOLIC BLOOD PRESSURE: 70 MMHG | HEART RATE: 72 BPM | SYSTOLIC BLOOD PRESSURE: 169 MMHG | TEMPERATURE: 98 F | OXYGEN SATURATION: 97 % | WEIGHT: 216.7 LBS | BODY MASS INDEX: 36.62 KG/M2

## 2021-06-04 VITALS
SYSTOLIC BLOOD PRESSURE: 175 MMHG | DIASTOLIC BLOOD PRESSURE: 81 MMHG | BODY MASS INDEX: 36.67 KG/M2 | TEMPERATURE: 98.1 F | WEIGHT: 217 LBS | HEART RATE: 63 BPM | OXYGEN SATURATION: 98 %

## 2021-06-04 NOTE — PROGRESS NOTES
Shireen comes in today to evaluate a seroma around her implant.  She was to start radiation but on simulation was found to have a fair amount of fluid primarily posterior but somewhat lateral to her implant.  She is feeling well.    Physical exam:  Well-healed incisions in in the chest wall bilaterally.  Implants are symmetric.  There does seem to be some fluid on the right side around the implant.  I attempted to push the implant medially and then attempted to aspirate some fluid laterally but could only get a small amount of serous fluid back.    I brought the patient to ultrasound and scanned the area aggressively.  Unfortunately there appeared to be a bit of implant coming over the entire area so that it comfortably see a area of fluid laterally that was not covered by implant and I did not feel comfortable continuing to attempt to aspirate anything.    Impression: Status post bilateral mastectomies for right breast cancer.  Because of positive lymph nodes patient needs radiation.  Unfortunately still has a fair amount of fluid around the implant on the right.  Have discussed this with Dr. Deal.  I will get a hold of her plastic surgeon, Dr. Culp, and see if we can come up with the plan.  It is possible that she will be more comfortable aspirating the area around the implant as she is more comfortable with dealing with implants once they are in place.  Otherwise the option is just to delay radiation a bit longer and allow the seroma to resolve.

## 2021-06-04 NOTE — TELEPHONE ENCOUNTER
Shireen called to relay that she saw Dr. Culp this morning and she was able to aspirate 90 cc from beneath her implant. She is wondering about next step with re-simulation for radiation. Will talk with Dr. Deal to discuss.  * Spoke with Dr. Deal and she would like Shireen to be re-simulated around 12/27 or early the following week. (She does not need to schedule another visit with Dr. Culp prior to simulation for further aspiration)  *Called Shireen to relay the above. She has been rescheduled for  re-simulation on 12/27 check in at 0930 a.m. at [facility]. She verbalized understanding of the plan. She understands that she will get a new treatment schedule in the future as her plan will not be ready by 12/30 as previously anticipated from first CT SIM.

## 2021-06-04 NOTE — TELEPHONE ENCOUNTER
Spoke with Shireen and she relayed that she is very sick right now with URI. Lexy requests to reschedule CT SIM that was scheduled for 0930 today to sometime next week when she is feeling better.  *Called Juany RED in SIM and relayed the above. She will reach out to Shireen to reschedule her CT SIM   *Called Shireen to relay the above.   *CM sent to Dr. Deal to update her

## 2021-06-04 NOTE — PROGRESS NOTES
[facility] a visit with Dr. Benavides regarding a seroma per request of Dr. Deal, prior to patient starting radiation.  RN assessment and EMR update.  Pulse 73   Resp 16   SpO2 99% .  Patient met with Dr. Benavides, see dictation for details of visit and follow up plan.  RN time 10 mins

## 2021-06-04 NOTE — TELEPHONE ENCOUNTER
Called Shireen and let her know that I am  having difficulty planning XRT  with fluid around implant.  Discussed with Dr Benavides who will she her tomorrow at 12:40.    SHe knows that will likely have to re-simulate after fluid managed.

## 2021-06-04 NOTE — TELEPHONE ENCOUNTER
Called Shireen to follow up on her healing. Dr. Culp has given her clearance to schedule CT SIM as she is 6 weeks post op and ready to move forward with radiation treatment. She reports that her mobility is improving and she feels that she will be able to hold the treatment position for CT SIM and treatment. She may take OTC medication to help with any discomfort. Spoke with Ava LOVE and she will reach out to Shireen to schedule CT SIM in the next week or two. Barbara, radiation RN was also informed via IB.

## 2021-06-05 NOTE — PROGRESS NOTES
RADIATION ONCOLOGY WEEKLY TREATMENT VISIT NOTE      Assessment:     1. Malignant neoplasm of lower-outer quadrant of right breast of female, estrogen receptor positive (H)        Cancer Staging  Breast cancer, right (H)  Staging form: Breast, AJCC 8th Edition  - Clinical: No stage assigned - Unsigned  - Pathologic stage from 2019: Stage IB (pT2(3), pN1, cM0, G2, ER+, FL+, HER2-, Oncotype DX score: 18) - Signed by Tasha Kaye MD on 2019       Impression/Plan:   64 y.o. female s/p bilateral mastectomy with bilateral reconsruction direct to implant with alloderm/silicone prepetoral placement implants, right breast IDC grade III, total >42mm, DCIS grade III, >70mm, all margins negative, 3/7 LN pos, ER/FL pos, HER-2 negative.    1. Continue radiation treatment as prescribed. Tolerating radiation well with minimal skin reaction and no fatigue at this time.     Radiation: Site: Right Breast  Stereotactic Radiosurgery: No  Stereotactic Radiosurgery: No  Concurrent Therapy: No  Today's Dose: 2700  Total Dose for Breast: 5040  Today's Fraction/Total Fraction Breast: 15/28      Subjective:      HPI: Shireen Simpson is a 64 y.o. female with    1. Malignant neoplasm of lower-outer quadrant of right breast of female, estrogen receptor positive (H)         The following portions of the patient's history were reviewed and updated as appropriate: allergies, current medications, past family history, past medical history, past social history, past surgical history and problem list.    Assessment                  Body Site: Breast                           Site: Right Breast  Stereotactic Radiosurgery: No  Concurrent Therapy: No  Today's Dose: 2700  Total Dose for Breast: 5040  Today's Fraction/Total Fraction Breast: 15/28  Drainage: 0: Absent                                           Emotional Alteration Copin: Effective  Comfort Alteration KPS: 90% Can perform normal activity, minor signs of disease  Fatigue  (ONS scale) : 1: Mild Fatigue  Pain Location: tenderness in breast  Pain Intensity. Rate degree of pain ranging from 0 (no pain) to 10 (severe pain) : 0-1  Pain Description: Dull intermittent - Dull type of ache which is intermittent  Pain Intervention: 0: None  Effectiveness of pain intervention: 4: Pain relieved 100%  Hot Flashes and/or Flushes: 1: Mild or no more than 1 per day   Nutrition Alteration Anorexia: 0: None  Nausea: 0: None  Vomitin: None  Skin Alteration Skin Sensation: 0: No problem(bruised feeling occasionally)  Skin Reaction: 1: Faint erythema or dry desquamation       Objective:     Exam:     Vitals:    20 0922   BP: 169/70   Pulse: 72   Temp: 98  F (36.7  C)   TempSrc: Oral   SpO2: 97%   Weight: 216 lb 11.2 oz (98.3 kg)       Wt Readings from Last 8 Encounters:   20 216 lb 11.2 oz (98.3 kg)   20 215 lb 6.4 oz (97.7 kg)   20 217 lb 8 oz (98.7 kg)   20 217 lb (98.4 kg)   19 220 lb 11.2 oz (100.1 kg)   19 219 lb (99.3 kg)   19 219 lb 6.4 oz (99.5 kg)   10/24/19 222 lb (100.7 kg)       General: Alert and oriented, in no acute distress  Shireen has scant erythema of right chest wall, no tenderness.     Treatment Summary to Date  See above  Aria chart and setup information reviewed    I, Timi Arroyo MD personally performed the services described in this documentation, as scribed by Alejandro Presley in my presence, and it is both accurate and complete.    Signed by: Timi Arroyo

## 2021-06-05 NOTE — TELEPHONE ENCOUNTER
Called [facility] pre request of Dr. Deal to secure a date for seroma aspiration related to reaccumulation of fluid by implant that is in the radiation treatment field.  Appointment was scheduled on Friday, 1/31 at 4:45 p.m.. She will need weekly aspiration appointments scheduled until radiation is completed on 2/25. They will schedule subsequent apts directly with Shireen tomorrow when she comes to clinic.  Appointment details relayed to Shireen. Dr. Deal updated.

## 2021-06-05 NOTE — PROGRESS NOTES
RADIATION ONCOLOGY WEEKLY TREATMENT VISIT NOTE      Assessment:     1. Malignant neoplasm of lower-outer quadrant of right breast of female, estrogen receptor positive (H)        Cancer Staging  Breast cancer, right (H)  Staging form: Breast, AJCC 8th Edition  - Clinical: No stage assigned - Unsigned  - Pathologic stage from 2019: Stage IB (pT2(3), pN1, cM0, G2, ER+, OK+, HER2-, Oncotype DX score: 18) - Signed by Tasha Kaye MD on 2019       Impression/Plan:   64 y.o. female s/p bilateral mastectomy with bilateral reconsruction direct to implant with alloderm/silicone prepetoral placement implants, right breast IDC grade III, total >42mm, DCIS grade III, >70mm, all margins negative, 3/7 LN pos, ER/OK pos, HER-2 negative.    1. Continue radiation treatment as prescribed.    Radiation: Site: Right Breast  Stereotactic Radiosurgery: No  Stereotactic Radiosurgery: No  Concurrent Therapy: No  Today's Dose: 1080  Total Dose for Breast: 5040  Today's Fraction/Total Fraction Breast:       Subjective:      HPI: Shireen Simpson is a 64 y.o. female with    1. Malignant neoplasm of lower-outer quadrant of right breast of female, estrogen receptor positive (H)         The following portions of the patient's history were reviewed and updated as appropriate: allergies, current medications, past family history, past medical history, past social history, past surgical history and problem list.    Assessment                  Body Site: Breast                           Site: Right Breast  Stereotactic Radiosurgery: No  Concurrent Therapy: No  Today's Dose: 1080  Total Dose for Breast: 5040  Today's Fraction/Total Fraction Breast:   Drainage: 0: Absent                                            Sexuality Alteration                 Emotional Alteration Copin: Effective  Comfort Alteration KPS: 100 % Normal, no complaints  Fatigue (ONS scale) : 0: No Fatigue  Pain Location: denies  Hot Flashes  and/or Flushes: 1: Mild or no more than 1 per day   Nutrition Alteration Anorexia: 0: None  Nausea: 0: None  Vomitin: None  Skin Alteration Skin Sensation: 0: No problem  Skin Reaction: 0: None  AUA Assessment                                  Accompanied by       Objective:     Exam:     Vitals:    20 1012   BP: 137/82   Pulse: 72   Temp: 98.3  F (36.8  C)   TempSrc: Oral   SpO2: 99%   Weight: 217 lb 8 oz (98.7 kg)       Wt Readings from Last 8 Encounters:   20 217 lb 8 oz (98.7 kg)   20 217 lb (98.4 kg)   19 220 lb 11.2 oz (100.1 kg)   19 219 lb (99.3 kg)   19 219 lb 6.4 oz (99.5 kg)   10/24/19 222 lb (100.7 kg)   19 222 lb (100.7 kg)   19 222 lb (100.7 kg)       General: Alert and oriented, in no acute distress  Shireen has no Erythema.    Treatment Summary to Date    Aria chart and setup information reviewed    I, Debbie Deal MD personally performed the services described in this documentation, as scribed by Alejandro Presley in my presence, and it is both accurate and complete.    Signed by: Debbie Deal MD, MPH

## 2021-06-05 NOTE — PROGRESS NOTES
RADIATION ONCOLOGY WEEKLY TREATMENT VISIT NOTE      Assessment:     1. Malignant neoplasm of lower-outer quadrant of right breast of female, estrogen receptor positive (H)        Cancer Staging  Breast cancer, right (H)  Staging form: Breast, AJCC 8th Edition  - Clinical: No stage assigned - Unsigned  - Pathologic stage from 2019: Stage IB (pT2(3), pN1, cM0, G2, ER+, [place]+, HER2-, Oncotype DX score: 18) - Signed by Tasha Kaye MD on 2019       Impression/Plan:   64 y.o. female s/p bilateral mastectomy with bilateral reconsruction direct to implant with alloderm/silicone prepetoral placement implants, right breast IDC grade III, total >42mm, DCIS grade III, >70mm, all margins negative, 3/7 LN pos, ER/[place] pos, HER-2 negative.    1. Continue radiation treatment as prescribed.  2. Seroma re-ocurring. Flash still covers, but will schedule weekly seroma drainage until XRT complete.     Radiation: Site: Right Breast  Stereotactic Radiosurgery: No  Stereotactic Radiosurgery: No  Concurrent Therapy: No  Today's Dose: 1800  Total Dose for Breast: 5040  Today's Fraction/Total Fraction Breast: 10/28      Subjective:      HPI: Shireen Simpson is a 64 y.o. female with    1. Malignant neoplasm of lower-outer quadrant of right breast of female, estrogen receptor positive (H)         The following portions of the patient's history were reviewed and updated as appropriate: allergies, current medications, past family history, past medical history, past social history, past surgical history and problem list.    Assessment                  Body Site: Breast                           Site: Right Breast  Stereotactic Radiosurgery: No  Concurrent Therapy: No  Today's Dose: 1800  Total Dose for Breast: 5040  Today's Fraction/Total Fraction Breast: 10/28  Drainage: 0: Absent                                            Sexuality Alteration                 Emotional Alteration Copin: Effective  Comfort Alteration  KPS: 90% Can perform normal activity, minor signs of disease  Fatigue (ONS scale) : 1: Mild Fatigue  Pain Location: denies  Hot Flashes and/or Flushes: 1: Mild or no more than 1 per day   Nutrition Alteration Anorexia: 0: None  Nausea: 0: None  Vomitin: None  Skin Alteration Skin Sensation: 0: No problem  Skin Reaction: 1: Faint erythema or dry desquamation  AUA Assessment                                  Accompanied by       Objective:     Exam:     Vitals:    20 0930   BP: 155/69   Pulse: 70   Temp: 97.8  F (36.6  C)   TempSrc: Oral   SpO2: 100%   Weight: 215 lb 6.4 oz (97.7 kg)       Wt Readings from Last 8 Encounters:   20 215 lb 6.4 oz (97.7 kg)   20 217 lb 8 oz (98.7 kg)   20 217 lb (98.4 kg)   19 220 lb 11.2 oz (100.1 kg)   19 219 lb (99.3 kg)   19 219 lb 6.4 oz (99.5 kg)   10/24/19 222 lb (100.7 kg)   19 222 lb (100.7 kg)       General: Alert and oriented, in no acute distress  Shireen has no Erythema,     Treatment Summary to Date    Aria chart and setup information reviewed    IDebbie MD personally performed the services described in this documentation, as scribed by Alejandro Presley in my presence, and it is both accurate and complete.    Signed by: Debbie Deal MD, MPH

## 2021-06-05 NOTE — PROGRESS NOTES
RADIATION ONCOLOGY WEEKLY TREATMENT VISIT NOTE      Assessment:     1. Malignant neoplasm of lower-outer quadrant of right breast of female, estrogen receptor positive (H)        Cancer Staging  Breast cancer, right (H)  Staging form: Breast, AJCC 8th Edition  - Clinical: No stage assigned - Unsigned  - Pathologic stage from 2019: Stage IB (pT2(3), pN1, cM0, G2, ER+, [place]+, HER2-, Oncotype DX score: 18) - Signed by Tasha Kaye MD on 2019       Impression/Plan:   64 y.o. female s/p bilateral mastectomy with bilateral reconsruction direct to implant with alloderm/silicone prepetoral placement implants, right breast IDC grade III, total >42mm, DCIS grade III, >70mm, all margins negative, 3/7 LN pos, ER/[place] pos, HER-2 negative.     1. Continue radiation treatment as prescribed.  2. Provided with radiaplex today and instructed on where to apply.  3. ROM exercises for the next 5-6 months.     Radiation: Site: Right CW  Stereotactic Radiosurgery: No  Stereotactic Radiosurgery: No  Concurrent Therapy: No  Today's Dose: 180  Total Dose for Breast: 5040  Today's Fraction/Total Fraction Breast:       Subjective:      HPI: Shireen Simpson is a 64 y.o. female with    1. Malignant neoplasm of lower-outer quadrant of right breast of female, estrogen receptor positive (H)         The following portions of the patient's history were reviewed and updated as appropriate: allergies, current medications, past family history, past medical history, past social history, past surgical history and problem list.    Assessment                  Body Site: CW                         Site: Right CW  Stereotactic Radiosurgery: No  Concurrent Therapy: No  Today's Dose: 180  Total Dose for Breast: 5040  Today's Fraction/Total Fraction CW:   Drainage: 0: Absent                                            Sexuality Alteration                 Emotional Alteration Copin: Effective  Comfort Alteration KPS: 100 %  Normal, no complaints  Fatigue (ONS scale) : 0: No Fatigue  Pain Location: denies  Hot Flashes and/or Flushes: 1: Mild or no more than 1 per day   Nutrition Alteration Anorexia: 0: None  Nausea: 0: None  Vomitin: None  Skin Alteration Skin Sensation: 0: No problem  Skin Reaction: 0: None(radiaplex given w/instructions)  AUA Assessment                                  Accompanied by       Objective:     Exam:     Vitals:    20 1100   BP: 175/81   Pulse: 63   Temp: 98.1  F (36.7  C)   TempSrc: Oral   SpO2: 98%   Weight: 217 lb (98.4 kg)       Wt Readings from Last 8 Encounters:   20 217 lb (98.4 kg)   19 220 lb 11.2 oz (100.1 kg)   19 219 lb (99.3 kg)   19 219 lb 6.4 oz (99.5 kg)   10/24/19 222 lb (100.7 kg)   19 222 lb (100.7 kg)   19 222 lb (100.7 kg)   16 192 lb (87.1 kg)       General: Alert and oriented, in no acute distress  Shireen has no Erythema.    Treatment Summary to Date    Aria chart and setup information reviewed    I, Debbie Deal MD personally performed the services described in this documentation, as scribed by Alejandro Presley in my presence, and it is both accurate and complete.    Signed by: Debbie Deal MD, MPH

## 2021-06-05 NOTE — TELEPHONE ENCOUNTER
Telephoned and spoke with Shireen to inform her we have received her voice message from Friday indicating Dr. Culp was unable to aspirate anything on 1/31/20 (See [place] note from 1/30/20).  Informed Shireen Tovar is out of the office, but her voice message was forwarded to rad onc triage nurses.  She expressed appreciation and has my number if she needs further follow up.Germania Hancock RN

## 2021-06-05 NOTE — PROGRESS NOTES
Checked in with Shireen. She has received a few radiation treatment and reports that treatment has gone smoothly for her. She has been feeling well. She has not needs at this time for navigator. Will follow up in the future. She has writers contact information.

## 2021-06-06 NOTE — TELEPHONE ENCOUNTER
Shireen called writer to relay that she called her [facility] in [place] and they will be faxing a copy of her dexa scan results to writer. She requests that I give a copy of the report to Dr. Deal as she had requested a copy. Writer confirmed that  A copy would be given to Dr. Deal and would also be sent to medical records for scanning so report would also be available in Epic for Dr. Kaye to review.  *Copy of report given to Billie Jimenez RN  *Copy of report sent to HIM for scanning.

## 2021-06-06 NOTE — PROGRESS NOTES
[facility] Hematology and Oncology Progress Note    Patient: Shireen Simpson  MRN: [id number]  Date of Service: 03/03/2020        Reason for Visit    Chief Complaint   Patient presents with     HE Cancer       Assessment and Plan  Cancer Staging  Breast cancer, right (H)  Staging form: Breast, AJCC 8th Edition  - Clinical: No stage assigned - Unsigned  - Pathologic stage from 11/6/2019: Stage IB (pT2(3), pN1, cM0, G2, ER+, [place]+, HER2-, Oncotype DX score: 18) - Signed by Tasha Kaye MD on 11/6/2019      ECOG Performance   ECOG Performance Status: 1     Distress Assessment  Distress Assessment Score: No distress    Pain  Currently in Pain: Yes  Pain Score (Initial OR Reassessment): 2  Location: chest sensitivity from radiation    #. Stage IB (pT2(3) pN1 M0) invasive ductal carcinoma of the right breast, multifocal, grade 2, ER strongly positive, NV moderately positive, HER-2 negative.  Oncotype DX recurrence score of 18, intermediate risk.      She is recovering from adjuvant radiation fairly well.  She continue anastrozole since August of last year.  She denies any intolerable side effects.  She feels comfortable continuing it.  Clinical exam is unremarkable today.   Refilled anastrozole.  Duration of treatment will be minimum 5 years.   Follow-up clinical exam in about 4 months.  She is advised to call me sooner with any clinical concerns.   No need for screening mammogram due to bilateral mastectomies.    #.  Low bone density   DEXA scan from July 2019 at hospitals showed low bone density.  She take calcium and vitamin D1 tablet and room multivitamin 1 tablet a day to complete the daily requirement of calcium and vitamin D.   Discuss about weightbearing exercises.   Follow-up bone density scan in July 2021.      Problem List    1. Malignant neoplasm of lower-outer quadrant of right breast of female, estrogen receptor positive (H)     2. Aromatase inhibitor use     3. Low bone density     4. Malignant neoplasm of  upper-outer quadrant of right breast in female, estrogen receptor positive (H)  anastrozole (ARIMIDEX) 1 mg tablet      ______________________________________________________________________________    Diagnosis/Treatment to date  July 2019- diagnosed with right breast cancer by screening mammogram which showed at least 2 irregular mass in the right breast.     -Core needle biopsy of the right breast mass and right axillary lymph node on 7/25/2019 and they were positive for invasive ductal carcinoma, grade 2, ER strongly positive (99%), [place] moderately positive (40%), HER-2 negative (1+ by IHC).    8/8/2019-started anastrozole.    9/6/2019-right breast lumpectomy and right sentinel lymph node biopsy completed.   It showed multifocal invasive ductal carcinoma, grade 2 with largest size of 30 mm.  Margins were uninvolved but less than 1 mm superior.  She has DCIS with EIC positive over 70 mm with less than 1 mm superior and inferior margin.  1 of the 2 axillary lymph node was positive for carcinoma.    10/24/2019-bilateral mastectomy and additional right axillary lymph node dissection.  Additional 5 lymph nodes were removed and 2 were positive for malignancy.      Oncotype DX recurrence score of 18, intermediate risk.  Post bilateral mastectomy and right axillary lymph node dissection.    11/2019-She decided against adjuvant chemotherapy.    2/25/2020-completed adjuvant radiation to the right breast and axilla. 5040 cGy/28 fractions by Dr. Deal.      History of Present Illness    Shireen present herself today.  She finished radiation last week.  She tolerated very well without any major complication.  She continue anastrozole daily and tolerates well.    Pain Status  Currently in Pain: Yes    Review of Systems    Oncology Nurse Assessment/CMA Intake: Constitutional  Constitutional (WDL): Exceptions to WDL  Fatigue: Fatigue relieved by rest  Neurosensory  Neurosensory (WDL): All neurosensory elements are within  defined limits  Eye   Eye Disorder (WDL): All eye disorder elements are within defined limits  Ear  Ear Disorder (WDL): All ear disorder elements are within defined limits  Cardiovascular  Cardiovascular (WDL): All cardiovascular elements are within defined limits  Pulmonary  Respiratory (WDL): Within Defined Limits  Gastrointestinal  Gastrointestinal (WDL): All gastrointestinal elements are within defined limits  Genitourinary  Genitourinary (WDL): All genitourinary elements are within defined limits  Lymphatic  Lymph (WDL): All lymph disorder elements are within defined limits  Musculoskeletal and Connective Tissue  Musculoskeletal and Connetive Tissue Disorders (WDL): All Musculoskeletal and Connetive Tissue Disorder elements are within defined limits  Integumentary  Integumentary (WDL): All integumentary elements are within defined limits  Patient Coping  Patient Coping: Accepting;Open/discussion  Distress Assessment  Distress Assessment Score: No distress  Accompanied by  Accompanied by: Alone  Oral Chemo Adherence         Past History  Past Medical History:   Diagnosis Date     Anemia     Borderline     Cancer (H)      Diabetes mellitus (H)      History of anesthesia complications     Breathing Tube Caused Difficulty Breathing Post Surgery     History of transfusion        Physical Exam    Recent Vitals 3/3/2020   Weight 219 lbs 14 oz   BSA (m2) 2.13 m2   /74   Pulse 80   Temp 98.7   Temp src 1   SpO2 98   Some recent data might be hidden     General: alert, awake, not in acute distress  HEENT: Head: Normal, normocephalic, atraumatic.  Eye: Normal external eye, conjunctiva, lids cornea, RONY.  Ears:  Non-tender.  Nose: Normal external nose, mucus membranes and septum.  Pharynx: Dental Hygiene adequate. Normal buccal mucosa. Normal pharynx.  Neck / Thyroid: Supple, no masses, nodes, nodules or enlargement.  Lymphatics: No abnormally enlarged lymph nodes.  Chest: Normal chest wall and respirations. Clear  to auscultation.  Breasts: Right breast skin showed post radiation changes.  No palpable masses bilaterally.  Heart: S1 S2 RRR, no murmur.   Abdomen: abdomen is soft without significant tenderness, masses, organomegaly or guarding  Extremities: normal strength, tone, and muscle mass  Skin: normal. no rash or abnormalities  CNS: non focal.    Lab Results    No results found for this or any previous visit (from the past 168 hour(s)).    Imaging    No results found.      Signed by: Tasha Kaye MD

## 2021-06-06 NOTE — PROGRESS NOTES
RADIATION ONCOLOGY WEEKLY TREATMENT VISIT NOTE      Assessment:     1. Malignant neoplasm of lower-outer quadrant of right breast of female, estrogen receptor positive (H)        Cancer Staging  Breast cancer, right (H)  Staging form: Breast, AJCC 8th Edition  - Clinical: No stage assigned - Unsigned  - Pathologic stage from 11/6/2019: Stage IB (pT2(3), pN1, cM0, G2, ER+, [place]+, HER2-, Oncotype DX score: 18) - Signed by Tasha Kaye MD on 11/6/2019       Impression/Plan:   64 y.o. female s/p bilateral mastectomy with bilateral reconsruction direct to implant with alloderm/silicone prepetoral placement implants, right breast IDC grade III, total >42mm, DCIS grade III, >70mm, all margins negative, 3/7 LN pos, ER/[place] pos, HER-2 negative. Dr Culp tried to remove fluid from seroma and unable.      1. Continue radiation treatment as prescribed.  2. Aquaphor to axilla likely to breakdown next week.   3. Cont ROM   4. Unable to use ibuprofen or ASA due to blood thinners, offered a small supply of Vicodin if necessary next week.     Radiation: Site: Right Breast  Stereotactic Radiosurgery: No  Stereotactic Radiosurgery: No  Concurrent Therapy: No  Today's Dose: 4500  Total Dose for Breast: 5040  Today's Fraction/Total Fraction Breast: 25/28      Subjective:      HPI: Shireen Simpson is a 64 y.o. female with    1. Malignant neoplasm of lower-outer quadrant of right breast of female, estrogen receptor positive (H)         The following portions of the patient's history were reviewed and updated as appropriate: allergies, current medications, past family history, past medical history, past social history, past surgical history and problem list.    Assessment                  Body Site: Breast                           Site: Right Breast  Stereotactic Radiosurgery: No  Concurrent Therapy: No  Today's Dose: 4500  Total Dose for Breast: 5040  Today's Fraction/Total Fraction Breast: 25/28  Drainage: 0: Absent                                             Sexuality Alteration                 Emotional Alteration Copin: Effective  Comfort Alteration KPS: 90% Can perform normal activity, minor signs of disease  Fatigue (ONS scale) : 2: Mild Fatigue  Pain Location: denies  Hot Flashes and/or Flushes: 1: Mild or no more than 1 per day   Nutrition Alteration Anorexia: 0: None  Nausea: 0: None  Vomitin: None  Skin Alteration Skin Sensation: 2: Burning(tenderness in right breast/axilla)  Skin Reaction: 3: Dry desquamation with or without erythema(addl radiaplex & aquaphor given)  AUA Assessment                                  Accompanied by       Objective:     Exam:     Vitals:    20 0917   BP: 145/88   Pulse: 75   Temp: 97.9  F (36.6  C)   TempSrc: Oral   SpO2: 99%   Weight: 218 lb 4.8 oz (99 kg)       Wt Readings from Last 8 Encounters:   20 218 lb 4.8 oz (99 kg)   20 217 lb 4.8 oz (98.6 kg)   20 216 lb 11.2 oz (98.3 kg)   20 215 lb 6.4 oz (97.7 kg)   20 217 lb 8 oz (98.7 kg)   20 217 lb (98.4 kg)   19 220 lb 11.2 oz (100.1 kg)   19 219 lb (99.3 kg)       General: Alert and oriented, in no acute distress  Shireen has mild Erythema.    Treatment Summary to Date    Aria chart and setup information reviewed    I, Debbie Deal MD personally performed the services described in this documentation, as scribed by Alejandro Presley in my presence, and it is both accurate and complete.    Signed by: Debbie Deal MD, MPH

## 2021-06-06 NOTE — PROGRESS NOTES
RADIATION ONCOLOGY WEEKLY TREATMENT VISIT NOTE      Assessment:     1. Malignant neoplasm of lower-outer quadrant of right breast of female, estrogen receptor positive (H)       Cancer Staging  Breast cancer, right (H)  Staging form: Breast, AJCC 8th Edition  - Clinical: No stage assigned - Unsigned  - Pathologic stage from 11/6/2019: Stage IB (pT2(3), pN1, cM0, G2, ER+, [place]+, HER2-, Oncotype DX score: 18) - Signed by Tasha Kaye MD on 11/6/2019       Impression/Plan:   64 y.o. female s/p bilateral mastectomy with bilateral reconsruction direct to implant with alloderm/silicone prepetoral placement implants, right breast IDC grade III, total >42mm, DCIS grade III, >70mm, all margins negative, 3/7 LN pos, ER/[place] pos, HER-2 negative. Dr Culp tried     1. Continue radiation treatment as prescribed.  2. Increase Radiaplex three times a day   3. Try Meplilex and gel sheets, but low threshold for Aquaphor.   4. Continue ROM   5. Dr Culp unable to get further fluid from seroma.          Radiation: Site: Right Breast  Stereotactic Radiosurgery: No  Stereotactic Radiosurgery: No  Concurrent Therapy: No  Today's Dose: 3600  Total Dose for Breast: 5040  Today's Fraction/Total Fraction Breast: 20/28        Subjective:      HPI: Shireen Simpson is a 64 y.o. female with    1. Malignant neoplasm of lower-outer quadrant of right breast of female, estrogen receptor positive (H)         The following portions of the patient's history were reviewed and updated as appropriate: allergies, current medications, past family history, past medical history, past social history, past surgical history and problem list.    Assessment                  Body Site: CW                           Site: Right Breast  Stereotactic Radiosurgery: No  Concurrent Therapy: No  Today's Dose: 3600  Total Dose for Breast: 5040  Today's Fraction/Total Fraction Breast: 20/28  Drainage: 0: Absent                                             Sexuality Alteration                 Emotional Alteration Copin: Effective  Comfort Alteration KPS: 90% Can perform normal activity, minor signs of disease  Fatigue (ONS scale) : 1: Mild Fatigue  Pain Location: denies  Hot Flashes and/or Flushes: 1: Mild or no more than 1 per day   Nutrition Alteration Anorexia: 0: None  Nausea: 0: None  Vomitin: None  Skin Alteration Skin Sensation: 2: Burning(right axilla & inframammary fold)  Skin Reaction: 2: Bright erythema(mepilex lite & radiacare gel sheets w/instructions)  AUA Assessment                                  Accompanied by       Objective:     Exam:     Vitals:    20 0933   BP: 176/86   Pulse: 69   Temp: 97.8  F (36.6  C)   TempSrc: Oral   SpO2: 99%   Weight: 217 lb 4.8 oz (98.6 kg)       Wt Readings from Last 8 Encounters:   20 217 lb 4.8 oz (98.6 kg)   20 216 lb 11.2 oz (98.3 kg)   20 215 lb 6.4 oz (97.7 kg)   20 217 lb 8 oz (98.7 kg)   20 217 lb (98.4 kg)   19 220 lb 11.2 oz (100.1 kg)   19 219 lb (99.3 kg)   19 219 lb 6.4 oz (99.5 kg)       General: Alert and oriented, in no acute distress  Shireen has mild Erythema.    Treatment Summary to Date    Aria chart and setup information reviewed    I, Debbie Deal MD personally performed the services described in this documentation, as scribed by Alejandro Presley in my presence, and it is both accurate and complete.    Signed by: Debbie Deal MD, MPH

## 2021-06-06 NOTE — PROGRESS NOTES
Patient here ambulatory for final radiation therapy treatment for her breast cancer.  Patient has bright erythema present in treatment area.  Reinforced skin cares.  Written discharge instructions reviewed and given to patient.  Follow up with Dr. Deal in about 4 to 6 weeks.  Patient verbalized understanding and left ambulatory with copy of appointments.

## 2021-06-06 NOTE — TELEPHONE ENCOUNTER
Called patient for routine follow up call s/p radiation for her breast cancer.  Patient states she is doing well and had no questions or concerns.  Confirmed follow up appointments and gave call back should patient need anything during the interim.

## 2021-06-07 NOTE — PROGRESS NOTES
"Assessment:     1. Malignant neoplasm of lower-outer quadrant of right breast of female, estrogen receptor positive (H)        Cancer Staging  Breast cancer, right (H)  Staging form: Breast, AJCC 8th Edition  - Clinical: No stage assigned - Unsigned  - Pathologic stage from 11/6/2019: Stage IB (pT2(3), pN1, cM0, G2, ER+, MA+, HER2-, Oncotype DX score: 18) - Signed by Tasha Kaye MD on 11/6/2019       Impression/Plan:   65 y.o. female s/p bilateral mastectomy with bilateral reconsruction direct to implant with alloderm/silicone prepetoral placement implants, right breast IDC grade III, total >42mm, DCIS grade III, >70mm, all margins negative, 3/7 LN pos, ER/MA pos, HER-2 negative. Dr Culp tried to remove fluid from seroma and unable.  Finished 5040 cGy/28 fx on 2/25/2020.      1. Continue ROM exercises for next 4-5 months.   2. Long term follow up with medical oncology.  3. Follow up with myself PRN.       Subjective:   Shireen Simpson is a 65 y.o. female who is being evaluated via a billable telephone visit.      The patient has been notified of following:     \"This telephone visit will be conducted via a call between you and your physician/provider. We have found that certain health care needs can be provided without the need for a physical exam.  This service lets us provide the care you need with a short phone conversation.  If a prescription is necessary we can send it directly to your pharmacy.  If lab work is needed we can place an order for that and you can then stop by our lab to have the test done at a later time.    If during the course of the call the physician/provider feels a telephone visit is not appropriate, you will not be charged for this service.\"     Patient has given verbal consent to a Telephone visit? Yes    Radiation Treatment Summary    HISTORY: Shireen Simpson is a 65 y.o. female who was treated with radiation therapy for right breast IDC and extensive DCIS.      The patient had a " screening mammogram performed showed abnormal masses in her right breast. A right breast needle US biopsy with LN biopsy was performed on 7/25/2019, pathology reurned with IDC grade II, DCIS grade III, ER/MO pos, HER-2 neg, axillary node pos with 0.7cm metastatic carcinoma.      The patient underwent a lumpectomy with SLN biopsy on 9/06/2019, pathology retunred with IDC grade II, multifocal 30 mm, DCIS grade III, present over a 70 mm area from superior and inferior, all margins were negative, (less than 1mm for both IDC and DCIS), 1/2 LN pos. Oncotype 18.      She then underwent a bilateral mastectomy, with bilateral reconsruction direct to implant with alloderm/silicone prepetoral placement implants on 10/24/2019, final pathology of th left breast was negative for malignancy. Final pathology of the right breast showed residual IDC grade 2-3, approximately 12 x 12 x 10 mm, margins negative (7mm), 2/5 LN pos (largest 9mm in a 2.2 cm LN), extranodal soft tissue extension not identified. PET CT on 11/05/2019 showing no active disease.      SITE TREATED: Right breast  TOTAL DOSE: 5040 cGy  NUMBER OF FRACTIONS: 28  DATES COMPLETED: 1/16/2020 - 2/25/2020  CONCURRENT CHEMOTHERAPY: No  ADJUVANT THERAPY:Yes    She tolerated the treatment without unexpected side effects.     The patient was contacted via telephone for routine office visit. She has been following up with her primary regarding a sinus infection but denies any fever, cough, or shortness of breath. Occasionally tylenol for discomfort after radiation and did not need Norco.  She also noticed some sharp shooting pains after surgery but otherwise no further complaints. Continues to stretch.       I have reviewed and updated the patient's Past Medical History, Social History, Family History and Medication List.    ALLERGIES  Augmentin [amoxicillin-pot clavulanate]; Iodinated contrast media; Iodine; Penicillins; Shellfish containing products; Lincoject; Neosporin  (lhj-vrm-qhdmd) [neomycin-bacitracnzn-polymyxnb]; and Teroderm plus  [cream base no.221 (bulk)]        Phone call duration: 12 minutes    I, Debbie Deal MD personally performed the services described in this documentation, as scribed by Alejandro Presley in my presence, and it is both accurate and complete.    Signed by: Debbie Deal MD, MPH

## 2021-06-07 NOTE — PROGRESS NOTES
Called patient for telephone follow up visit s/p radiation for her breast cancer.  Patient states she had some initial pain in her breast post treatment but much better now.  States skin has healed.  Talked to Dr. Deal.  Plan RTC for follow up as directed by physician.

## 2021-06-08 ENCOUNTER — RECORDS - HEALTHEAST (OUTPATIENT)
Dept: LAB | Facility: CLINIC | Age: 66
End: 2021-06-08

## 2021-06-08 LAB
ALBUMIN SERPL-MCNC: 4.2 G/DL (ref 3.5–5)
ALP SERPL-CCNC: 82 U/L (ref 45–120)
ALT SERPL W P-5'-P-CCNC: 23 U/L (ref 0–45)
ANION GAP SERPL CALCULATED.3IONS-SCNC: 12 MMOL/L (ref 5–18)
AST SERPL W P-5'-P-CCNC: 22 U/L (ref 0–40)
BILIRUB SERPL-MCNC: 0.6 MG/DL (ref 0–1)
BUN SERPL-MCNC: 12 MG/DL (ref 8–22)
CALCIUM SERPL-MCNC: 9.4 MG/DL (ref 8.5–10.5)
CHLORIDE BLD-SCNC: 104 MMOL/L (ref 98–107)
CO2 SERPL-SCNC: 24 MMOL/L (ref 22–31)
CREAT SERPL-MCNC: 0.64 MG/DL (ref 0.6–1.1)
GFR SERPL CREATININE-BSD FRML MDRD: >60 ML/MIN/1.73M2
GLUCOSE BLD-MCNC: 131 MG/DL (ref 70–125)
POTASSIUM BLD-SCNC: 4 MMOL/L (ref 3.5–5)
PROT SERPL-MCNC: 6.7 G/DL (ref 6–8)
SODIUM SERPL-SCNC: 140 MMOL/L (ref 136–145)

## 2021-06-10 NOTE — PROGRESS NOTES
Shireen is here today for ongoing management of breast cancer. Today is a 4 month f/u with OV with Dr. Kaye. Stephy Dean

## 2021-06-11 NOTE — TELEPHONE ENCOUNTER
I spoke with Shireen regarding the SCP that was mailed to her. She acknowledged receiving it and did not have any questions/feedback for me. She added that she is not participating in any Survivorship programs as she does not feel the need. I reminded her of my contact information should she need anything in the future. I congratulated her on her Survivorship and wished her a great day !!  DIXON Ponce, OCN, CBCN

## 2021-06-11 NOTE — PROGRESS NOTES
[facility] Hematology and Oncology Progress Note    Patient: Shireen Simpson  MRN: [id number]  Date of Service: 08/25/2020        Reason for Visit    Chief Complaint   Patient presents with     HE Cancer     Breast Cancer       Assessment and Plan  Cancer Staging  Breast cancer, right (H)  Staging form: Breast, AJCC 8th Edition  - Clinical: No stage assigned - Unsigned  - Pathologic stage from 11/6/2019: Stage IB (pT2(3), pN1, cM0, G2, ER+, [place]+, HER2-, Oncotype DX score: 18) - Signed by Tasha Kaye MD on 11/6/2019      ECOG Performance   ECOG Performance Status: 0     Distress Assessment  Distress Assessment Score: Unable to rate(high stress due to fall yesterday)    Pain  Currently in Pain: Yes  Pain Score (Initial OR Reassessment): 8  Location: right chest/breast/axilla/thorax    #. Stage IB (pT2(3) pN1 M0) invasive ductal carcinoma of the right breast. ER strongly positive, NY moderately positive, HER2 negative.    She is overall doing well without signs and symptoms suggestive of breast cancer recurrence. No recalled side effects of Arimidex. OK to continue for minimum of 5 years.    Follow up clinical exam in 6 months.   No need for mammogram.    #.  Low bone density   DEXA scan from July 2019 at [facility] showed low bone density.     Continue Ca/vitamin D and MVI daily.    Plan for repeat DEXA in 7/2021.     #. Pain in right chest wall after mechanical fall   Improving. Not appear to have fractures. Continue current supportive care.    Problem List    1. Malignant neoplasm of overlapping sites of right breast in female, estrogen receptor positive (H)     2. Aromatase inhibitor use        ______________________________________________________________________________    Diagnosis/Treatment to date  July 2019- diagnosed with right breast cancer by screening mammogram which showed at least 2 irregular mass in the right breast.     -Core needle biopsy of the right breast mass and right axillary lymph node on  7/25/2019 and they were positive for invasive ductal carcinoma, grade 2, ER strongly positive (99%), IN moderately positive (40%), HER-2 negative (1+ by IHC).    8/8/2019-started anastrozole.    9/6/2019-right breast lumpectomy and right sentinel lymph node biopsy completed.   It showed multifocal invasive ductal carcinoma, grade 2 with largest size of 30 mm.  Margins were uninvolved but less than 1 mm superior.  She has DCIS with EIC positive over 70 mm with less than 1 mm superior and inferior margin.  1 of the 2 axillary lymph node was positive for carcinoma.    10/24/2019-bilateral mastectomy and additional right axillary lymph node dissection.  Additional 5 lymph nodes were removed and 2 were positive for malignancy.      Oncotype DX recurrence score of 18, intermediate risk.  Post bilateral mastectomy and right axillary lymph node dissection.    11/2019-She decided against adjuvant chemotherapy.    2/25/2020-completed adjuvant radiation to the right breast and axilla. 5040 cGy/28 fractions by Dr. Deal.      History of Present Illness    Shireen presented herself today.     She reported fell yesterday from mechanical fall. She hurts on right chest, shoulder, arm. Taking analgesics.  Getting better today. No concerns of other bone pain, headaches. Doing well otherwise.    Pain Status  Currently in Pain: Yes    Review of Systems    Oncology Nurse Assessment/CMA Intake: Constitutional  Constitutional (WDL): Exceptions to WDL  Neurosensory  Neurosensory (WDL): All neurosensory elements are within defined limits  Eye   Eye Disorder (WDL): Assessment not pertinent to visit  Ear  Ear Disorder (WDL): Assessment not pertinent to visit  Cardiovascular  Cardiovascular (WDL): All cardiovascular elements are within defined limits  Pulmonary  Respiratory (WDL): Within Defined Limits  Gastrointestinal  Gastrointestinal (WDL): All gastrointestinal elements are within defined limits  Genitourinary  Genitourinary (WDL): All  genitourinary elements are within defined limits  Lymphatic  Lymph (WDL): All lymph disorder elements are within defined limits  Musculoskeletal and Connective Tissue  Musculoskeletal and Connetive Tissue Disorders (WDL): All Musculoskeletal and Connetive Tissue Disorder elements are within defined limits  Integumentary  Integumentary (WDL): All integumentary elements are within defined limits  Patient Coping  Patient Coping: Open/discussion  Distress Assessment  Distress Assessment Score: Unable to rate(high stress due to fall yesterday)  Accompanied by  Accompanied by: Alone  Oral Chemo Adherence         Past History  Past Medical History:   Diagnosis Date     Anemia     Borderline     Cancer (H)      Diabetes mellitus (H)      History of anesthesia complications     Breathing Tube Caused Difficulty Breathing Post Surgery     History of transfusion        Physical Exam    Recent Vitals 8/25/2020   Weight 210 lbs   BSA (m2) 2.08 m2   /77   Pulse 67   Temp 98.2   Temp src 1   SpO2 97   Some recent data might be hidden     General: alert, awake, not in acute distress  HEENT: Head: Normal, normocephalic, atraumatic.  Eye: Normal external eye, conjunctiva, lids cornea, RONY.  Ears:  Non-tender.  Nose: Normal external nose, mucus membranes and septum.  Pharynx: Dental Hygiene adequate. Normal buccal mucosa. Normal pharynx.  Neck / Thyroid: Supple, no masses, nodes, nodules or enlargement.  Lymphatics: No abnormally enlarged lymph nodes.  Chest: Normal chest wall and respirations. Clear to auscultation.  Breasts: surgically absent bilateral breasts. No palpable abnormalities. Some red spots appears larger petechiae and freckles on the right chest.  Heart: S1 S2 RRR, no murmur.   Abdomen: abdomen is soft without significant tenderness, masses, organomegaly or guarding  Extremities: normal strength, tone, and muscle mass  Skin: normal. no rash or abnormalities  CNS: non focal.    Lab Results    No results found for  this or any previous visit (from the past 168 hour(s)).    Imaging    No results found.      Signed by: Tasha Kaye MD

## 2021-06-12 NOTE — TELEPHONE ENCOUNTER
Called Shamar that writer was calling to check in. Contact information was provided if any questions, concerns or support needs arise. Will follow up in the future.

## 2021-06-13 NOTE — TELEPHONE ENCOUNTER
Shireen returned call to writer from last week She relays that she is doing well. She is tolerating endocrine therapy ok and shared that she has delayed fat grafting surgery with Dr. Culp until after the pandemic. She is not involved in an organized support groups but has good support system with family and friends.   She received letter from  [employer] regarding [employer] insurance not being in network next year. She has looked at the other insurance plans on the [employer] website but based on the coverage she is not certain that she feelsl inclined to switch her carrier. She relayed that all of her and her 's other providers are in network with [employer] so staying with this plan makes the most sense for them. They are also looking at hospital coverage more fully before making a final decision. [facility] is in network with [employer] and she may elect to establish care at either their [facility] or  clinic. She wants a female provider and she has names of providers that she will research in their organization. She has the [employer] Hotline number and will also ask them if the would continue extending her coverage until February 2021 so she can have one more office visit with Dr. Kaye. She will let writer know the outcome of her decision.  She has writer's contact information for future reference and calls invited.

## 2021-06-16 PROBLEM — Z79.811 AROMATASE INHIBITOR USE: Status: ACTIVE | Noted: 2020-03-03

## 2021-06-16 PROBLEM — E78.5 HLD (HYPERLIPIDEMIA): Status: ACTIVE | Noted: 2019-08-08

## 2021-06-16 PROBLEM — K21.9 ACID REFLUX: Status: ACTIVE | Noted: 2019-08-08

## 2021-06-16 PROBLEM — C50.911 BREAST CANCER, RIGHT (H): Status: ACTIVE | Noted: 2019-08-13

## 2021-06-16 PROBLEM — M85.9 LOW BONE DENSITY: Status: ACTIVE | Noted: 2020-03-03

## 2021-06-16 PROBLEM — E11.9 DM2 (DIABETES MELLITUS, TYPE 2) (H): Status: ACTIVE | Noted: 2019-08-08

## 2021-06-19 NOTE — LETTER
Letter by La Wallace RN at      Author: La Wallace RN Service: -- Author Type: --    Filed:  Encounter Date: 10/28/2019 Status: Signed       Dear Shireen,  Thank you for choosing [facility] for your care.  We are committed to providing you with the highest quality and compassionate healthcare services.  The following information pertains to your first appointment with our clinic.    Date/Time of appointment:  Wednesday, November 6, check-in at 10:30 a.m.      Name of your Physician:  Tasha Kaye MD    What to bring to your appointment:    Completed Patient History/Initial Nursing Assessment and Medication/Allergy List (these forms were sent to you).    Any paperwork or films from your physician that we have asked you to bring.    Your current insurance card(s).    Parking:    Please refer to the map included to direct you to [facility].    You can park in any visitor/patient parking area you choose. There is no charge for parking at [facility].     Enter the hospital at the front/main entrance.      Please check in with our  representatives who will escort you to the clinic located in [address] of the [facility].    We hope these instructions are helpful to you.  If you have any questions or concerns, please call us at [phone].  It is our pleasure to assist you.    Warm Regards,  La Wallace, RN  Nurse Navigator  [phone]

## 2021-06-19 NOTE — LETTER
Letter by La Wallace RN at      Author: La Wallace RN Service: -- Author Type: --    Filed:  Encounter Date: 10/28/2019 Status: Signed       Dear Shireen,  Thank you for choosing [facility] for your care.  We are committed to providing you with the highest quality and compassionate healthcare services.  The following information pertains to your first appointment with our clinic.    Date/Time of appointment:  Monday, November 4, Check in at 9:30 a.m.    Name of your Physician: Debbie Deal MD    What to bring to your appointment:    Completed Patient History/Initial Nursing Assessment and Medication/Allergy List (these forms were sent to you).    Any paperwork or films from your physician that we have asked you to bring.    Your current insurance card(s).    Parking:    Please refer to the map included to direct you to [facility].    The Radiation Oncology parking lot is west of the main entrance, this is free parking and is right next to the Cancer Care Entrance.    Come in the Cancer Care Entrance and check in.        We hope these instructions are helpful to you.  If you have any questions or concerns, please call us at [phone].  It is our pleasure to assist you.    Warm Regards,  La Wallace, DIXON  Nurse Navigator  [phone]

## 2021-07-03 NOTE — ADDENDUM NOTE
Addendum Note by Haroldo Gill MD at 10/24/2019  4:20 PM     Author: Haroldo Gill MD Service: -- Author Type: Physician    Filed: 10/24/2019  4:20 PM Date of Service: 10/24/2019  4:20 PM Status: Signed    : Haroldo Gill MD (Physician)       Addendum  created 10/24/19 1620 by Haroldo Gill MD    Order list changed

## 2021-07-03 NOTE — ADDENDUM NOTE
Addendum Note by Lombardi, Susan L, RN at 9/17/2019 12:42 PM     Author: Lombardi, Susan L, RN Service: -- Author Type: RN, Care Manager    Filed: 9/17/2019  5:07 PM Date of Service: 9/17/2019 12:42 PM Status: Signed    : Lombardi, Susan L, RN (RN, Care Manager)    Encounter addended by: Lombardi, Susan L, RN on: 9/17/2019  5:07 PM      Actions taken: Sign clinical note, Charge Capture section accepted

## 2021-07-03 NOTE — ADDENDUM NOTE
Addendum Note by Sicard, Shannon M, CRNA at 10/23/2019  6:08 PM     Author: Sicard, Shannon M, CRNA Service: -- Author Type: Nurse Anesthetist    Filed: 10/23/2019  6:08 PM Date of Service: 10/23/2019  6:08 PM Status: Signed    : Sicard, Shannon M, CRNA (Nurse Anesthetist)       Addendum  created 10/23/19 1808 by Sicard, Shannon M, CRNA    Intraprocedure Event edited, Intraprocedure Staff edited

## 2021-07-03 NOTE — ADDENDUM NOTE
Addendum Note by Lombardi, Susan L, RN at 8/8/2019  8:37 AM     Author: Lombardi, Susan L, RN Service: -- Author Type: RN, Care Manager    Filed: 8/8/2019 12:51 PM Date of Service: 8/8/2019  8:37 AM Status: Signed    : Lombardi, Susan L, RN (RN, Care Manager)    Encounter addended by: Lombardi, Susan L, RN on: 8/8/2019 12:51 PM      Actions taken: Sign clinical note, Charge Capture section accepted

## 2021-07-03 NOTE — ADDENDUM NOTE
Addendum Note by Lombardi, Susan L, RN at 9/17/2019 12:42 PM     Author: Lombardi, Susan L, RN Service: -- Author Type: RN, Care Manager    Filed: 9/19/2019  3:55 PM Date of Service: 9/17/2019 12:42 PM Status: Signed    : Lombardi, Susan L, RN (RN, Care Manager)    Encounter addended by: Lombardi, Susan L, RN on: 9/19/2019  3:55 PM      Actions taken: Pharmacy for encounter modified, Order Reconciliation   Section accessed

## 2021-07-18 ENCOUNTER — HEALTH MAINTENANCE LETTER (OUTPATIENT)
Age: 66
End: 2021-07-18

## 2021-07-19 ENCOUNTER — LAB REQUISITION (OUTPATIENT)
Dept: LAB | Facility: CLINIC | Age: 66
End: 2021-07-19
Payer: COMMERCIAL

## 2021-07-19 DIAGNOSIS — Z01.818 ENCOUNTER FOR OTHER PREPROCEDURAL EXAMINATION: ICD-10-CM

## 2021-07-19 LAB
ANION GAP SERPL CALCULATED.3IONS-SCNC: 13 MMOL/L (ref 5–18)
BUN SERPL-MCNC: 12 MG/DL (ref 8–22)
CALCIUM SERPL-MCNC: 9.3 MG/DL (ref 8.5–10.5)
CHLORIDE BLD-SCNC: 102 MMOL/L (ref 98–107)
CO2 SERPL-SCNC: 26 MMOL/L (ref 22–31)
CREAT SERPL-MCNC: 0.68 MG/DL (ref 0.6–1.1)
GFR SERPL CREATININE-BSD FRML MDRD: 90 ML/MIN/1.73M2
GLUCOSE BLD-MCNC: 128 MG/DL (ref 70–125)
POTASSIUM BLD-SCNC: 3.9 MMOL/L (ref 3.5–5)
SODIUM SERPL-SCNC: 141 MMOL/L (ref 136–145)

## 2021-07-19 PROCEDURE — 80048 BASIC METABOLIC PNL TOTAL CA: CPT | Performed by: PHYSICIAN ASSISTANT

## 2021-07-19 PROCEDURE — 36415 COLL VENOUS BLD VENIPUNCTURE: CPT | Performed by: PHYSICIAN ASSISTANT

## 2021-09-12 ENCOUNTER — HEALTH MAINTENANCE LETTER (OUTPATIENT)
Age: 66
End: 2021-09-12

## 2022-02-10 ENCOUNTER — LAB REQUISITION (OUTPATIENT)
Dept: LAB | Facility: CLINIC | Age: 67
End: 2022-02-10
Payer: COMMERCIAL

## 2022-02-10 DIAGNOSIS — E11.9 TYPE 2 DIABETES MELLITUS WITHOUT COMPLICATIONS (H): ICD-10-CM

## 2022-02-10 DIAGNOSIS — E78.2 MIXED HYPERLIPIDEMIA: ICD-10-CM

## 2022-02-10 PROCEDURE — 80061 LIPID PANEL: CPT | Mod: ORL | Performed by: PHYSICIAN ASSISTANT

## 2022-02-10 PROCEDURE — 82043 UR ALBUMIN QUANTITATIVE: CPT | Mod: ORL | Performed by: PHYSICIAN ASSISTANT

## 2022-02-10 PROCEDURE — 80053 COMPREHEN METABOLIC PANEL: CPT | Mod: ORL | Performed by: PHYSICIAN ASSISTANT

## 2022-02-11 LAB
ALBUMIN SERPL-MCNC: 3.4 G/DL (ref 3.5–5)
ALP SERPL-CCNC: 77 U/L (ref 45–120)
ALT SERPL W P-5'-P-CCNC: 11 U/L (ref 0–45)
ANION GAP SERPL CALCULATED.3IONS-SCNC: 13 MMOL/L (ref 5–18)
AST SERPL W P-5'-P-CCNC: 13 U/L (ref 0–40)
BILIRUB SERPL-MCNC: 0.5 MG/DL (ref 0–1)
BUN SERPL-MCNC: 13 MG/DL (ref 8–22)
CALCIUM SERPL-MCNC: 9.4 MG/DL (ref 8.5–10.5)
CHLORIDE BLD-SCNC: 104 MMOL/L (ref 98–107)
CHOLEST SERPL-MCNC: 142 MG/DL
CO2 SERPL-SCNC: 25 MMOL/L (ref 22–31)
CREAT SERPL-MCNC: 0.61 MG/DL (ref 0.6–1.1)
CREAT UR-MCNC: 203 MG/DL
FASTING STATUS PATIENT QL REPORTED: ABNORMAL
GFR SERPL CREATININE-BSD FRML MDRD: >90 ML/MIN/1.73M2
GLUCOSE BLD-MCNC: 98 MG/DL (ref 70–125)
HDLC SERPL-MCNC: 34 MG/DL
LDLC SERPL CALC-MCNC: 57 MG/DL
MICROALBUMIN UR-MCNC: 2.26 MG/DL (ref 0–1.99)
MICROALBUMIN/CREAT UR: 11.1 MG/G CR
POTASSIUM BLD-SCNC: 4.2 MMOL/L (ref 3.5–5)
PROT SERPL-MCNC: 6.5 G/DL (ref 6–8)
SODIUM SERPL-SCNC: 142 MMOL/L (ref 136–145)
TRIGL SERPL-MCNC: 253 MG/DL

## 2022-02-27 ENCOUNTER — HEALTH MAINTENANCE LETTER (OUTPATIENT)
Age: 67
End: 2022-02-27

## 2022-06-19 ENCOUNTER — HEALTH MAINTENANCE LETTER (OUTPATIENT)
Age: 67
End: 2022-06-19

## 2022-10-17 ENCOUNTER — LAB REQUISITION (OUTPATIENT)
Dept: LAB | Facility: CLINIC | Age: 67
End: 2022-10-17
Payer: COMMERCIAL

## 2022-10-17 DIAGNOSIS — E11.9 TYPE 2 DIABETES MELLITUS WITHOUT COMPLICATIONS (H): ICD-10-CM

## 2022-10-17 LAB
ALBUMIN SERPL BCG-MCNC: 4.5 G/DL (ref 3.5–5.2)
ALP SERPL-CCNC: 76 U/L (ref 35–104)
ALT SERPL W P-5'-P-CCNC: 21 U/L (ref 10–35)
ANION GAP SERPL CALCULATED.3IONS-SCNC: 14 MMOL/L (ref 7–15)
AST SERPL W P-5'-P-CCNC: 21 U/L (ref 10–35)
BILIRUB SERPL-MCNC: 0.4 MG/DL
BUN SERPL-MCNC: 15.7 MG/DL (ref 8–23)
CALCIUM SERPL-MCNC: 9.4 MG/DL (ref 8.8–10.2)
CHLORIDE SERPL-SCNC: 103 MMOL/L (ref 98–107)
CREAT SERPL-MCNC: 0.69 MG/DL (ref 0.51–0.95)
DEPRECATED HCO3 PLAS-SCNC: 24 MMOL/L (ref 22–29)
GFR SERPL CREATININE-BSD FRML MDRD: >90 ML/MIN/1.73M2
GLUCOSE SERPL-MCNC: 103 MG/DL (ref 70–99)
POTASSIUM SERPL-SCNC: 3.9 MMOL/L (ref 3.4–5.3)
PROT SERPL-MCNC: 6.5 G/DL (ref 6.4–8.3)
SODIUM SERPL-SCNC: 141 MMOL/L (ref 136–145)

## 2022-10-17 PROCEDURE — 80053 COMPREHEN METABOLIC PANEL: CPT | Mod: ORL | Performed by: PHYSICIAN ASSISTANT

## 2022-11-19 ENCOUNTER — HEALTH MAINTENANCE LETTER (OUTPATIENT)
Age: 67
End: 2022-11-19

## 2023-05-26 ENCOUNTER — LAB REQUISITION (OUTPATIENT)
Dept: LAB | Facility: CLINIC | Age: 68
End: 2023-05-26
Payer: COMMERCIAL

## 2023-05-26 DIAGNOSIS — E78.2 MIXED HYPERLIPIDEMIA: ICD-10-CM

## 2023-05-26 LAB
CHOLEST SERPL-MCNC: 159 MG/DL
HDLC SERPL-MCNC: 37 MG/DL
LDLC SERPL CALC-MCNC: 53 MG/DL
NONHDLC SERPL-MCNC: 122 MG/DL
TRIGL SERPL-MCNC: 343 MG/DL

## 2023-05-26 PROCEDURE — 80061 LIPID PANEL: CPT | Mod: ORL | Performed by: PHYSICIAN ASSISTANT

## 2023-06-01 ENCOUNTER — HEALTH MAINTENANCE LETTER (OUTPATIENT)
Age: 68
End: 2023-06-01

## 2023-07-01 ENCOUNTER — HEALTH MAINTENANCE LETTER (OUTPATIENT)
Age: 68
End: 2023-07-01

## 2023-11-09 ENCOUNTER — LAB REQUISITION (OUTPATIENT)
Dept: LAB | Facility: CLINIC | Age: 68
End: 2023-11-09
Payer: COMMERCIAL

## 2023-11-09 DIAGNOSIS — E11.65 TYPE 2 DIABETES MELLITUS WITH HYPERGLYCEMIA (H): ICD-10-CM

## 2023-11-09 PROCEDURE — 82570 ASSAY OF URINE CREATININE: CPT | Mod: ORL | Performed by: FAMILY MEDICINE

## 2023-11-09 PROCEDURE — 80053 COMPREHEN METABOLIC PANEL: CPT | Mod: ORL | Performed by: FAMILY MEDICINE

## 2023-11-10 LAB
ALBUMIN SERPL BCG-MCNC: 4.5 G/DL (ref 3.5–5.2)
ALP SERPL-CCNC: 68 U/L (ref 35–104)
ALT SERPL W P-5'-P-CCNC: 28 U/L (ref 0–50)
ANION GAP SERPL CALCULATED.3IONS-SCNC: 14 MMOL/L (ref 7–15)
AST SERPL W P-5'-P-CCNC: 31 U/L (ref 0–45)
BILIRUB SERPL-MCNC: 0.5 MG/DL
BUN SERPL-MCNC: 16.8 MG/DL (ref 8–23)
CALCIUM SERPL-MCNC: 9.9 MG/DL (ref 8.8–10.2)
CHLORIDE SERPL-SCNC: 103 MMOL/L (ref 98–107)
CREAT SERPL-MCNC: 0.71 MG/DL (ref 0.51–0.95)
CREAT UR-MCNC: 187 MG/DL
DEPRECATED HCO3 PLAS-SCNC: 25 MMOL/L (ref 22–29)
EGFRCR SERPLBLD CKD-EPI 2021: >90 ML/MIN/1.73M2
GLUCOSE SERPL-MCNC: 131 MG/DL (ref 70–99)
MICROALBUMIN UR-MCNC: 21.8 MG/L
MICROALBUMIN/CREAT UR: 11.66 MG/G CR (ref 0–25)
POTASSIUM SERPL-SCNC: 4.1 MMOL/L (ref 3.4–5.3)
PROT SERPL-MCNC: 7 G/DL (ref 6.4–8.3)
SODIUM SERPL-SCNC: 142 MMOL/L (ref 135–145)

## 2024-01-27 ENCOUNTER — HEALTH MAINTENANCE LETTER (OUTPATIENT)
Age: 69
End: 2024-01-27

## 2024-05-06 ENCOUNTER — LAB REQUISITION (OUTPATIENT)
Dept: LAB | Facility: CLINIC | Age: 69
End: 2024-05-06
Payer: COMMERCIAL

## 2024-05-06 DIAGNOSIS — E78.2 MIXED HYPERLIPIDEMIA: ICD-10-CM

## 2024-05-06 LAB
ALBUMIN SERPL BCG-MCNC: 4.3 G/DL (ref 3.5–5.2)
ALP SERPL-CCNC: 65 U/L (ref 40–150)
ALT SERPL W P-5'-P-CCNC: 18 U/L (ref 0–50)
ANION GAP SERPL CALCULATED.3IONS-SCNC: 13 MMOL/L (ref 7–15)
AST SERPL W P-5'-P-CCNC: 21 U/L (ref 0–45)
BILIRUB SERPL-MCNC: 0.4 MG/DL
BUN SERPL-MCNC: 17.7 MG/DL (ref 8–23)
CALCIUM SERPL-MCNC: 9.7 MG/DL (ref 8.8–10.2)
CHLORIDE SERPL-SCNC: 103 MMOL/L (ref 98–107)
CHOLEST SERPL-MCNC: 115 MG/DL
CREAT SERPL-MCNC: 0.66 MG/DL (ref 0.51–0.95)
DEPRECATED HCO3 PLAS-SCNC: 25 MMOL/L (ref 22–29)
EGFRCR SERPLBLD CKD-EPI 2021: >90 ML/MIN/1.73M2
FASTING STATUS PATIENT QL REPORTED: ABNORMAL
GLUCOSE SERPL-MCNC: 196 MG/DL (ref 70–99)
HDLC SERPL-MCNC: 33 MG/DL
LDLC SERPL CALC-MCNC: 31 MG/DL
NONHDLC SERPL-MCNC: 82 MG/DL
POTASSIUM SERPL-SCNC: 4.4 MMOL/L (ref 3.4–5.3)
PROT SERPL-MCNC: 6.9 G/DL (ref 6.4–8.3)
SODIUM SERPL-SCNC: 141 MMOL/L (ref 135–145)
TRIGL SERPL-MCNC: 255 MG/DL

## 2024-05-06 PROCEDURE — 80061 LIPID PANEL: CPT | Mod: ORL | Performed by: FAMILY MEDICINE

## 2024-05-06 PROCEDURE — 80053 COMPREHEN METABOLIC PANEL: CPT | Mod: ORL | Performed by: FAMILY MEDICINE

## 2024-11-06 ENCOUNTER — LAB REQUISITION (OUTPATIENT)
Dept: LAB | Facility: CLINIC | Age: 69
End: 2024-11-06
Payer: COMMERCIAL

## 2024-11-06 DIAGNOSIS — E11.9 TYPE 2 DIABETES MELLITUS WITHOUT COMPLICATIONS (H): ICD-10-CM

## 2024-11-06 PROCEDURE — 80053 COMPREHEN METABOLIC PANEL: CPT | Mod: ORL | Performed by: FAMILY MEDICINE

## 2024-11-06 PROCEDURE — 82570 ASSAY OF URINE CREATININE: CPT | Mod: ORL | Performed by: FAMILY MEDICINE

## 2024-11-06 PROCEDURE — 82043 UR ALBUMIN QUANTITATIVE: CPT | Mod: ORL | Performed by: FAMILY MEDICINE

## 2024-11-07 LAB
ALBUMIN SERPL BCG-MCNC: 4.6 G/DL (ref 3.5–5.2)
ALP SERPL-CCNC: 74 U/L (ref 40–150)
ALT SERPL W P-5'-P-CCNC: 20 U/L (ref 0–50)
ANION GAP SERPL CALCULATED.3IONS-SCNC: 14 MMOL/L (ref 7–15)
AST SERPL W P-5'-P-CCNC: 22 U/L (ref 0–45)
BILIRUB SERPL-MCNC: 0.5 MG/DL
BUN SERPL-MCNC: 15.3 MG/DL (ref 8–23)
CALCIUM SERPL-MCNC: 10.4 MG/DL (ref 8.8–10.4)
CHLORIDE SERPL-SCNC: 101 MMOL/L (ref 98–107)
CREAT SERPL-MCNC: 0.69 MG/DL (ref 0.51–0.95)
CREAT UR-MCNC: 115 MG/DL
EGFRCR SERPLBLD CKD-EPI 2021: >90 ML/MIN/1.73M2
GLUCOSE SERPL-MCNC: 84 MG/DL (ref 70–99)
HCO3 SERPL-SCNC: 26 MMOL/L (ref 22–29)
MICROALBUMIN UR-MCNC: 23.8 MG/L
MICROALBUMIN/CREAT UR: 20.7 MG/G CR (ref 0–25)
POTASSIUM SERPL-SCNC: 4.3 MMOL/L (ref 3.4–5.3)
PROT SERPL-MCNC: 7 G/DL (ref 6.4–8.3)
SODIUM SERPL-SCNC: 141 MMOL/L (ref 135–145)

## 2025-07-22 ENCOUNTER — LAB REQUISITION (OUTPATIENT)
Dept: LAB | Facility: CLINIC | Age: 70
End: 2025-07-22
Payer: COMMERCIAL

## 2025-07-22 DIAGNOSIS — D50.9 IRON DEFICIENCY ANEMIA, UNSPECIFIED: ICD-10-CM

## 2025-07-22 DIAGNOSIS — E11.9 TYPE 2 DIABETES MELLITUS WITHOUT COMPLICATIONS (H): ICD-10-CM

## 2025-07-22 LAB
ALBUMIN SERPL BCG-MCNC: 4.5 G/DL (ref 3.5–5.2)
ALP SERPL-CCNC: 66 U/L (ref 40–150)
ALT SERPL W P-5'-P-CCNC: 18 U/L (ref 0–50)
ANION GAP SERPL CALCULATED.3IONS-SCNC: 13 MMOL/L (ref 7–15)
AST SERPL W P-5'-P-CCNC: 22 U/L (ref 0–45)
BILIRUB SERPL-MCNC: 0.5 MG/DL
BUN SERPL-MCNC: 17.5 MG/DL (ref 8–23)
CALCIUM SERPL-MCNC: 10 MG/DL (ref 8.8–10.4)
CHLORIDE SERPL-SCNC: 104 MMOL/L (ref 98–107)
CREAT SERPL-MCNC: 0.77 MG/DL (ref 0.51–0.95)
EGFRCR SERPLBLD CKD-EPI 2021: 82 ML/MIN/1.73M2
FERRITIN SERPL-MCNC: 180 NG/ML (ref 11–328)
GLUCOSE SERPL-MCNC: 82 MG/DL (ref 70–99)
HCO3 SERPL-SCNC: 26 MMOL/L (ref 22–29)
IRON BINDING CAPACITY (ROCHE): 300 UG/DL (ref 240–430)
IRON SATN MFR SERPL: 29 % (ref 15–46)
IRON SERPL-MCNC: 86 UG/DL (ref 37–145)
POTASSIUM SERPL-SCNC: 4.1 MMOL/L (ref 3.4–5.3)
PROT SERPL-MCNC: 6.8 G/DL (ref 6.4–8.3)
SODIUM SERPL-SCNC: 143 MMOL/L (ref 135–145)